# Patient Record
Sex: FEMALE | Race: WHITE | Employment: UNEMPLOYED | URBAN - METROPOLITAN AREA
[De-identification: names, ages, dates, MRNs, and addresses within clinical notes are randomized per-mention and may not be internally consistent; named-entity substitution may affect disease eponyms.]

---

## 2017-07-03 ENCOUNTER — HOSPITAL ENCOUNTER (OUTPATIENT)
Dept: LAB | Age: 32
Discharge: HOME | End: 2017-07-03
Payer: OTHER GOVERNMENT

## 2017-07-03 ENCOUNTER — OFFICE VISIT (OUTPATIENT)
Dept: FAMILY MEDICINE CLINIC | Age: 32
End: 2017-07-03

## 2017-07-03 ENCOUNTER — OFFICE VISIT (OUTPATIENT)
Dept: OBGYN CLINIC | Age: 32
End: 2017-07-03

## 2017-07-03 VITALS
HEART RATE: 78 BPM | DIASTOLIC BLOOD PRESSURE: 71 MMHG | RESPIRATION RATE: 16 BRPM | BODY MASS INDEX: 20.72 KG/M2 | HEIGHT: 62 IN | SYSTOLIC BLOOD PRESSURE: 102 MMHG | TEMPERATURE: 99.1 F | OXYGEN SATURATION: 98 % | WEIGHT: 112.6 LBS

## 2017-07-03 VITALS
DIASTOLIC BLOOD PRESSURE: 72 MMHG | WEIGHT: 113 LBS | HEART RATE: 84 BPM | SYSTOLIC BLOOD PRESSURE: 97 MMHG | BODY MASS INDEX: 20.8 KG/M2 | RESPIRATION RATE: 18 BRPM | HEIGHT: 62 IN

## 2017-07-03 DIAGNOSIS — J30.89 ENVIRONMENTAL AND SEASONAL ALLERGIES: ICD-10-CM

## 2017-07-03 DIAGNOSIS — Z76.89 ENCOUNTER TO ESTABLISH CARE: ICD-10-CM

## 2017-07-03 DIAGNOSIS — Z01.419 WELL WOMAN EXAM WITH ROUTINE GYNECOLOGICAL EXAM: Primary | ICD-10-CM

## 2017-07-03 DIAGNOSIS — J02.9 SORE THROAT: Primary | ICD-10-CM

## 2017-07-03 DIAGNOSIS — R05.9 COUGH: ICD-10-CM

## 2017-07-03 LAB
S PYO AG THROAT QL: NEGATIVE
VALID INTERNAL CONTROL?: YES

## 2017-07-03 PROCEDURE — 87625 HPV TYPES 16 & 18 ONLY: CPT | Performed by: OBSTETRICS & GYNECOLOGY

## 2017-07-03 PROCEDURE — 88175 CYTOPATH C/V AUTO FLUID REDO: CPT | Performed by: OBSTETRICS & GYNECOLOGY

## 2017-07-03 PROCEDURE — 87624 HPV HI-RISK TYP POOLED RSLT: CPT | Performed by: OBSTETRICS & GYNECOLOGY

## 2017-07-03 PROCEDURE — 87491 CHLMYD TRACH DNA AMP PROBE: CPT | Performed by: OBSTETRICS & GYNECOLOGY

## 2017-07-03 RX ORDER — MICONAZOLE NITRATE 2 %
1 CREAM WITH APPLICATOR VAGINAL
COMMUNITY
End: 2018-01-30

## 2017-07-03 RX ORDER — LORATADINE 10 MG/1
10 TABLET ORAL
Qty: 30 TAB | Refills: 0 | Status: SHIPPED | OUTPATIENT
Start: 2017-07-03

## 2017-07-03 RX ORDER — BENZONATATE 100 MG/1
100 CAPSULE ORAL
Qty: 21 CAP | Refills: 0 | Status: SHIPPED | OUTPATIENT
Start: 2017-07-03 | End: 2017-07-10

## 2017-07-03 NOTE — PROGRESS NOTES
Christiano Muro is a 29 y.o.  female and presents with    Chief Complaint   Patient presents with    Cough     Patient has had cough for about a week.  Establish Care       Subjective:  Ms. Nancy Narayanan presents today as a new patient with complaints of cough that has been present for about a week. Initially cough was a dry cough but now she states she is coughing up whitish/greenish mucous. She reports post nasal drip but denies runny nose. She denies fever or chills. She states her  and daughter have recently gotten over coughs. She does not have history of seasonal allergies. She states the cough is worse in the mornings. She has not tried any over the counter medications for her symptoms. Additional Concerns: Ms. Nancy Narayanan is here to establish care. There is no problem list on file for this patient. No Known Allergies  History reviewed. No pertinent past medical history. Past Surgical History:   Procedure Laterality Date    HX GYN      cryotherapy to cervix      Family History   Problem Relation Age of Onset    Cancer Mother      Breast Cancer/Cervical Cancer    COPD Mother     No Known Problems Father     Cancer Maternal Aunt      Metastatic Cancer     Social History   Substance Use Topics    Smoking status: Former Smoker    Smokeless tobacco: Never Used    Alcohol use Yes      Comment: ocassionally       ROS   History obtained from the patient  General ROS: negative for - chills or fever  Ophthalmic ROS: negative for - itchy eyes  ENT ROS: positive for - sore throat  Respiratory ROS: positive for - cough and shortness of breath  Cardiovascular ROS: no chest pain or dyspnea on exertion    All other systems reviewed and are negative.       Objective:  Vitals:    07/03/17 1257   BP: 102/71   Pulse: 78   Resp: 16   Temp: 99.1 °F (37.3 °C)   TempSrc: Oral   SpO2: 98%   Weight: 112 lb 9.6 oz (51.1 kg)   Height: 5' 2\" (1.575 m)   PainSc:   0 - No pain   LMP: 06/24/2017 PE  General appearance - alert, well appearing, and in no distress  Mental status - normal mood, behavior, speech, dress, motor activity, and thought processes  Ears - bilateral TM's and external ear canals normal  Mouth - mucous membranes moist, pharynx normal without lesions  Neck - supple, no significant adenopathy  Chest - clear to auscultation, no wheezes, rales or rhonchi, symmetric air entry  Heart - normal rate and regular rhythm    LABS   7/3/2017  1:21 PM - Evins Lights, LPN   Component Results   Component Value Flag Ref Range Units Status   VALID INTERNAL CONTROL POC Yes    Final   Group A Strep Ag Negative  Negative  Final       Assessment/Plan:    1. Sore Throat- rapid strep negative; symptomatic care    2. Cough- may be related to allergies; tessalon for symptoms    3. Environmental allergies- trial of Claritin for at least 2 weeks to see if symptoms subside; return to office if they persist    Lab review: no lab studies available for review at time of visit    Today's Visit: Tessalon, Claritin    Health Maintenance:     I have discussed the diagnosis with the patient and the intended plan as seen in the above orders. The patient has received an after-visit summary and questions were answered concerning future plans. I have discussed medication side effects and warnings with the patient as well. I have reviewed the plan of care with the patient, accepted their input and they are in agreement with the treatment goals. Follow-up Disposition:  Return if symptoms worsen or fail to improve. More than 1/2 of this 20 minute visit was spent in counseling and coordination of care, as described above.     Kim Mane, RAMIN

## 2017-07-03 NOTE — PROGRESS NOTES
1. Have you been to the ER, urgent care clinic since your last visit? Hospitalized since your last visit? No    2. Have you seen or consulted any other health care providers outside of the [address] [place] [address] since your last visit? Include any pap smears or colon screening.  No

## 2017-07-03 NOTE — PROGRESS NOTES
[facility]  [address] [facility], [address] [place], [place] [address] EXAM    Name: Shalom Gupta MRN: [id number] SSN: [id number]    YOB: 1985  Age: 29 y.o. Sex: female       Subjective:      Chief complaint:    Chief Complaint   Patient presents with    Well Woman       Anais Zamora is a 29 y.o. female presents today for well visit. No complaints. Taking monistat 7 for yeast infection. Review of Systems:   General ROS: negative for - fever, chills, malaise  Endocrine ROS: negative for -  breast tenderness/mass/nipple discharge/galactorrhea, hair pattern changes, hot flashes, skin changes or temperature intolerance. Respiratory ROS: no cough, shortness of breath, or wheezing  Cardiovascular ROS: no chest pain or dyspnea on exertion  Gastrointestinal ROS: no abdominal pain, change in bowel habits, or black or bloody stools, nausea, vomiting  Genito-Urinary ROS: no dysuria, trouble voiding, incontinence or hematuria. No pelvic pain, abnormal bleeding  Musculoskeletal ROS: negative  Neurological ROS: no TIA or stroke symptoms  Dermatological ROS: negative  Denies personal or FH of fractures. OB History      Para Term  AB Living    5 3 3  2 3    SAB TAB Ectopic Molar Multiple Live Births    2     3        Gynecology:  Regular menses since menarche. 28 day cycle. Duration 5-7 days. Moderate flow. mild cramps. no hx of STI. The current method of family planning is condoms most of the time. Health maintenance:  hx of abnormal pap smears, s/p cryo  Last pap:  .   Past Medical History:   Diagnosis Date    Abnormal Papanicolaou smear of cervix      Past Surgical History:   Procedure Laterality Date    HX GYN      cryotherapy to cervix      Family History   Problem Relation Age of Onset    Cancer Mother 52     Breast Cancer/Cervical Cancer    COPD Mother     No Known Problems Father     Cancer Maternal Aunt 44 Metastatic Cancer, unclear primary     Social History     Occupational History    Not on file. Social History Main Topics    Smoking status: Former Smoker    Smokeless tobacco: Never Used    Alcohol use Yes      Comment: ocassionally    Drug use: No    Sexual activity: Yes     Partners: Male     Birth control/ protection: Condom     Family History   Problem Relation Age of Onset    Cancer Mother 52     Breast Cancer/Cervical Cancer    COPD Mother     No Known Problems Father     Cancer Maternal Aunt 40     Metastatic Cancer, unclear primary     No Known Allergies  Prior to Admission medications    Medication Sig Start Date End Date Taking? Authorizing Provider   miconazole (MONISTAT 7) 2 % vaginal cream Insert 1 Applicator into vagina nightly. Yes Historical Provider   benzonatate (TESSALON) 100 mg capsule Take 1 Cap by mouth three (3) times daily as needed for Cough for up to 7 days. 7/3/17 7/10/17  Alberto Smith NP   loratadine (CLARITIN) 10 mg tablet Take 1 Tab by mouth daily as needed for Allergies. 7/3/17   Alberto Smith NP          Objective:     Vitals:    07/03/17 1524   BP: 97/72   Pulse: 84   Resp: 18   Weight: 113 lb (51.3 kg)   Height: 5' 2\" (1.575 m)     Body mass index is 20.67 kg/(m^2). Physical Exam:  General appearance - well appearing, and in no distress and well hydrated  Mental status - alert, oriented to person, place, and time, normal mood, behavior, speech, dress, motor activity, and thought processes  Neck:  No lymphadenopathy, no thyroid enlargement/tenderness  Respiratory:  Normal respiratory effort, no intercostal retractions or use of accessory muscles. Breasts - breasts appear normal, no suspicious masses, no skin or nipple changes or axillary nodes  Abdomen - soft, nontender, nondistended, no masses or organomegaly  Pelvic - No inguinal lymphadenopathy, normal urethral meatus and no bladder tenderness.  VULVA: normal appearing vulva with no masses, tenderness or lesions, VAGINA: normal appearing vagina with normal color and monistat wiped to visualize cervix, no lesions, PELVIC FLOOR EXAM: no cystocele, rectocele or prolapse noted,CERVIX: normal appearing cervix without discharge or lesions, UTERUS: uterus is normal size, shape, consistency and nontender, mobile, ADNEXA: normal adnexa in size, nontender and no masses  Extremities - No edema. Skin - normal coloration and turgor, no rashes, no suspicious skin lesions noted    Assessment/Plan:       ICD-10-CM ICD-9-CM    1. Well woman exam with routine gynecological exam Z01.419 V72.31 PAP IG, CT-NG-TV, APTIMA HPV AND [address] 25/74,42([id number])       Encouraged breast self-awareness. Start PNV  Current pap smear screening guidelines reviewed. Continue healthy lifestyle with staying nicotine-free, caloric restrictions and regular exercise. Follow up annually/prn. Plan of care discussed. Patient expressed understanding.       Signed By:  Delvis Harding DO     July 3, 2017

## 2017-07-03 NOTE — MR AVS SNAPSHOT
Visit Information Date & Time Provider Department Dept. Phone Encounter #  
 7/3/2017  1:00 PM Iraida Ferrara NP [address] [address] [phone] Follow-up Instructions Return if symptoms worsen or fail to improve. Your Appointments 7/3/2017  3:15 PM  
NEW PATIENT ANNUAL with Stellanoemy MacdonaldDO  
[address] [place] [address] ([facility]) Appt Note: NEW PATIENT, ANNUAL,  
 [address] [facility] [address] [phone]  
  
   
 [address] [facility] [phone] Upcoming Health Maintenance Date Due DTaP/Tdap/Td series (1 - Tdap) 2/12/2008 PAP AKA CERVICAL CYTOLOGY 2/12/2008 INFLUENZA AGE 9 TO ADULT 8/1/2017 Allergies as of 7/3/2017  Review Complete On: 7/3/2017 By: Iraida Ferrara NP No Known Allergies Current Immunizations  Never Reviewed No immunizations on file. Not reviewed this visit You Were Diagnosed With   
  
 Codes Comments Sore throat    -  Primary ICD-10-CM: J02.9 ICD-9-CM: 209 Cough     ICD-10-CM: R05 ICD-9-CM: 786.2 Environmental and seasonal allergies     ICD-10-CM: J30.89 ICD-9-CM: 477.8 Encounter to establish care     ICD-10-CM: Z76.89 
ICD-9-CM: V65.8 Vitals BP Pulse Temp Resp Height(growth percentile) Weight(growth percentile) 102/71 (BP 1 Location: Left arm, BP Patient Position: Sitting) 78 99.1 °F (37.3 °C) (Oral) 16 5' 2\" (1.575 m) 112 lb 9.6 oz (51.1 kg) LMP SpO2 BMI OB Status Smoking Status 06/24/2017 98% 20.59 kg/m2 Having regular periods Former Smoker BMI and BSA Data Body Mass Index Body Surface Area 20.59 kg/m 2 1.5 m 2 Preferred Pharmacy Pharmacy Name Phone [facility] [address] [facility] 11 [facility] [phone] Your Updated Medication List  
  
   
This list is accurate as of: 7/3/17  2:01 PM.  Always use your most recent med list.  
  
  
  
 benzonatate 100 mg capsule Commonly known as:  TESSALON Take 1 Cap by mouth three (3) times daily as needed for Cough for up to 7 days. loratadine 10 mg tablet Commonly known as:  Og Moulder Take 1 Tab by mouth daily as needed for Allergies. Prescriptions Sent to Pharmacy Refills  
 benzonatate (TESSALON) 100 mg capsule 0 Sig: Take 1 Cap by mouth three (3) times daily as needed for Cough for up to 7 days. Class: Normal  
 Pharmacy: [facility] [address] [place] [address] #: [phone] Route: Oral  
 loratadine (CLARITIN) 10 mg tablet 0 Sig: Take 1 Tab by mouth daily as needed for Allergies. Class: Normal  
 Pharmacy: [facility] [address] [place] [address] #: [phone] Route: Oral  
  
We Performed the Following AMB POC RAPID STREP A [20243 CPT(R)] Follow-up Instructions Return if symptoms worsen or fail to improve. Introducing [facility] & HEALTH SERVICES! [facility] introduces [employer] patient portal. Now you can access parts of your medical record, email your doctor's office, and request medication refills online. 1. In your internet browser, go to [email or web address] 2. Click on the First Time User? Click Here link in the Sign In box. You will see the New Member Sign Up page. 3. Enter your [employer] Access Code exactly as it appears below. You will not need to use this code after youve completed the sign-up process. If you do not sign up before the expiration date, you must request a new code. · [employer] Access Code: [email or web address] Expires: 10/1/2017  1:53 PM 
 
4. Enter the last four digits of your Social Security Number (xxxx) and Date of Birth (mm/dd/yyyy) as indicated and click Submit. You will be taken to the next sign-up page. 5. Create a [employer] ID. This will be your [employer] login ID and cannot be changed, so think of one that is secure and easy to remember. 6. Create a [employer] password. You can change your password at any time. 7. Enter your Password Reset Question and Answer. This can be used at a later time if you forget your password. 8. Enter your e-mail address. You will receive e-mail notification when new information is available in [address] 9. Click Sign Up. You can now view and download portions of your medical record. 10. Click the Download Summary menu link to download a portable copy of your medical information. If you have questions, please visit the Frequently Asked Questions section of the [employer] website. Remember, [employer] is NOT to be used for urgent needs. For medical emergencies, dial 911. Now available from your iPhone and Android! Please provide this summary of care documentation to your next provider. If you have any questions after today's visit, please call [phone].

## 2017-07-03 NOTE — PATIENT INSTRUCTIONS

## 2017-07-05 LAB
C TRACH RRNA SPEC QL NAA+PROBE: NEGATIVE
N GONORRHOEA RRNA SPEC QL NAA+PROBE: NEGATIVE
SPECIMEN SOURCE: NORMAL
T VAGINALIS RRNA SPEC QL NAA+PROBE: NEGATIVE

## 2017-07-17 NOTE — PROGRESS NOTES
Patient notified and voices understanding:  Abn pap smear, Dr. Domingo Tatum recommends colposcopy. patient agrees to call back for appt after discussing this with spouse dur to transportation issue.

## 2017-07-26 ENCOUNTER — HOSPITAL ENCOUNTER (OUTPATIENT)
Dept: LAB | Age: 32
Discharge: HOME | End: 2017-07-26
Payer: OTHER GOVERNMENT

## 2017-07-26 ENCOUNTER — OFFICE VISIT (OUTPATIENT)
Dept: OBGYN CLINIC | Age: 32
End: 2017-07-26

## 2017-07-26 VITALS
SYSTOLIC BLOOD PRESSURE: 110 MMHG | DIASTOLIC BLOOD PRESSURE: 82 MMHG | HEART RATE: 130 BPM | BODY MASS INDEX: 20.24 KG/M2 | WEIGHT: 110 LBS | HEIGHT: 62 IN

## 2017-07-26 DIAGNOSIS — Z32.02 URINE PREGNANCY TEST NEGATIVE: ICD-10-CM

## 2017-07-26 DIAGNOSIS — R87.810 ASCUS WITH POSITIVE HIGH RISK HPV CERVICAL: Primary | ICD-10-CM

## 2017-07-26 DIAGNOSIS — R30.9 PAINFUL URINATION: ICD-10-CM

## 2017-07-26 DIAGNOSIS — R87.610 ASCUS WITH POSITIVE HIGH RISK HPV CERVICAL: Primary | ICD-10-CM

## 2017-07-26 LAB
BILIRUB UR QL STRIP: NEGATIVE
GLUCOSE UR-MCNC: NEGATIVE MG/DL
HCG URINE, QL. (POC): NEGATIVE
KETONES P FAST UR STRIP-MCNC: NEGATIVE MG/DL
PH UR STRIP: 8 [PH] (ref 4.6–8)
PROT UR QL STRIP: NEGATIVE MG/DL
SP GR UR STRIP: 1.01 (ref 1–1.03)
UA UROBILINOGEN AMB POC: NORMAL (ref 0.2–1)
URINALYSIS CLARITY POC: CLEAR
URINALYSIS COLOR POC: YELLOW
URINE BLOOD POC: NEGATIVE
URINE LEUKOCYTES POC: NEGATIVE
URINE NITRITES POC: NEGATIVE
VALID INTERNAL CONTROL?: YES

## 2017-07-26 PROCEDURE — 88305 TISSUE EXAM BY PATHOLOGIST: CPT | Performed by: OBSTETRICS & GYNECOLOGY

## 2017-07-26 NOTE — MR AVS SNAPSHOT
Visit Information Date & Time Provider Department Dept. Phone Encounter #  
 7/26/2017  9:00 AM DO Kenrick Felder [phone] [id number] Upcoming Health Maintenance Date Due INFLUENZA AGE 9 TO ADULT 8/1/2017 PAP AKA CERVICAL CYTOLOGY 7/3/2020 Allergies as of 7/26/2017  Review Complete On: 7/26/2017 By: Melania Baptiste LPN No Known Allergies Current Immunizations  Never Reviewed No immunizations on file. Not reviewed this visit You Were Diagnosed With   
  
 Codes Comments ASCUS with positive high risk HPV cervical    -  Primary ICD-10-CM: R87.610, R87.810 ICD-9-CM: 795.01, 795.05 Vitals BP Pulse Height(growth percentile) Weight(growth percentile) LMP BMI  
 110/82 (!) 130 5' 2\" (1.575 m) 110 lb (49.9 kg) 07/17/2017 (Approximate) 20.12 kg/m2 OB Status Smoking Status Having regular periods Former Smoker BMI and BSA Data Body Mass Index Body Surface Area  
 20.12 kg/m 2 1.48 m 2 Preferred Pharmacy Pharmacy Name Phone [facility], [address] [phone] Your Updated Medication List  
  
   
This list is accurate as of: 7/26/17 10:12 AM.  Always use your most recent med list.  
  
  
  
  
 loratadine 10 mg tablet Commonly known as:  Theone Handing Take 1 Tab by mouth daily as needed for Allergies. MONISTAT 7 2 % vaginal cream  
Generic drug:  miconazole Insert 1 Applicator into vagina nightly. We Performed the Following CT COLPOSC,CERVIX W/ADJ VAG,W/BX & CURRETAG U4700549 CPT(R)] Patient Instructions Colposcopy: What to Expect at [facility] Your Recovery You may feel some soreness in your vagina for a day or two if you had a biopsy. Some vaginal bleeding or discharge is normal for up to a week after a biopsy.  The discharge may be dark-colored if a solution was put on your cervix. You can use a sanitary pad for the bleeding. It may take a week or two for you to get the test results. This care sheet gives you a general idea about how long it will take for you to recover. But each person recovers at a different pace. Follow the steps below to feel better as quickly as possible. How can you care for yourself at home? Activity · You can return to work and most daily activities right after the test. 
Exercise · Do not exercise for 1 day after the test. 
Medicines · Your doctor will tell you if and when you can restart your medicines. He or she will also give you instructions about taking any new medicines. · If you take blood thinners, such as warfarin (Coumadin), clopidogrel (Plavix), or aspirin, be sure to talk to your doctor. He or she will tell you if and when to start taking those medicines again. Make sure that you understand exactly what your doctor wants you to do. · Take an over-the-counter pain medicine, such as acetaminophen (Tylenol), ibuprofen (Advil, Motrin), or naproxen (Aleve). Be safe with medicines. Read and follow all instructions on the label. Do not take two or more pain medicines at the same time unless the doctor told you to. Many pain medicines have acetaminophen, which is Tylenol. Too much acetaminophen (Tylenol) can be harmful. Other instructions · Use a pad if you have some bleeding. · Do not douche, have sexual intercourse, or use tampons for 1 week if you had a biopsy. This will allow time for your cervix to heal. 
· You can take a bath or shower anytime after the test. 
Follow-up care is a key part of your treatment and safety. Be sure to make and go to all appointments, and call your doctor if you are having problems. It's also a good idea to know your test results and keep a list of the medicines you take. When should you call for help? Call your doctor now or seek immediate medical care if: · You have severe vaginal bleeding. This means that you are soaking through your usual pads or tampons each hour for 2 or more hours. · You have pain that does not get better after you take pain medicine. · You have signs of infection, such as: 
¨ Increased pain. ¨ Bad-smelling vaginal discharge. ¨ A fever. Watch closely for any changes in your health, and be sure to contact your doctor if: 
· You have questions or concerns. Where can you learn more? Go to http://danial-patricia.info/. Enter M523 in the search box to learn more about \"Colposcopy: What to Expect at Home. \" Current as of: May 3, 2017 Content Version: 11.3 © 2931-3194 [employer]. Care instructions adapted under license by [employer] (which disclaims liability or warranty for this information). If you have questions about a medical condition or this instruction, always ask your healthcare professional. [place] [address] any warranty or liability for your use of this information. Introducing [facility] & HEALTH SERVICES! Héctor Harper introduces [employer] patient portal. Now you can access parts of your medical record, email your doctor's office, and request medication refills online. 1. In your internet browser, go to [email or web address] 2. Click on the First Time User? Click Here link in the Sign In box. You will see the New Member Sign Up page. 3. Enter your [employer] Access Code exactly as it appears below. You will not need to use this code after youve completed the sign-up process. If you do not sign up before the expiration date, you must request a new code. · [employer] Access Code: [email or web address] Expires: 10/1/2017  1:53 PM 
 
4. Enter the last four digits of your Social Security Number (xxxx) and Date of Birth (mm/dd/yyyy) as indicated and click Submit. You will be taken to the next sign-up page. 5. Create a [employer] ID. This will be your [employer] login ID and cannot be changed, so think of one that is secure and easy to remember. 6. Create a [employer] password. You can change your password at any time. 7. Enter your Password Reset Question and Answer. This can be used at a later time if you forget your password. 8. Enter your e-mail address. You will receive e-mail notification when new information is available in [address] 9. Click Sign Up. You can now view and download portions of your medical record. 10. Click the Download Summary menu link to download a portable copy of your medical information. If you have questions, please visit the Frequently Asked Questions section of the [employer] website. Remember, [employer] is NOT to be used for urgent needs. For medical emergencies, dial 911. Now available from your iPhone and Android! Please provide this summary of care documentation to your next provider. If you have any questions after today's visit, please call [phone].

## 2017-07-26 NOTE — PROGRESS NOTES
Patient c/o painful urination without frequency/urgency. Here for scheduled colposcopy. See procedure note. A/P    ICD-10-CM ICD-9-CM    1. ASCUS with positive high risk HPV cervical R87.610 795.01 [place] COLPOSC,CERVIX W/ADJ VAG,W/BX & CURRETAG    R87.810 795.05 SURGICAL PATHOLOGY      AMB POC URINE PREGNANCY TEST, VISUAL COLOR COMPARISON   2. Painful urination R30.9 788.1 CULTURE, URINE      AMB POC URINALYSIS DIP STICK MANUAL W/O MICRO   UA unremarkable. Increase PO hydration. Plan for repeat pap in 12 mos. Early follow up depending on pathology report. Plan of care discussed. Patient expressed understanding and agreement.

## 2017-07-26 NOTE — PROCEDURES
[facility]  [address] [facility] [place], [place]  [phone]                                      COLPOSCOPY    Chart reviewed for the following:   Nimisha SALDANA DO, have reviewed the History, Physical and updated the Allergic reactions for Edna Magana performed immediately prior to start of procedure:   Nimisha SALDANA DO, have performed the following reviews on Colon [place] prior to the start of the procedure:            * Patient was identified by name and date of birth   * Agreement on procedure being performed was verified  * Risks and Benefits explained to the patient  * Procedure site verified and marked as necessary  * Patient was positioned for comfort  * Consent was signed and verified     Time: 0950    Date of procedure: 7/26/2017    Procedure performed by:  Nimisha Gonzales DO    Provider assisted by: Doris Yadav LPN    Patient assisted by: self    How tolerated by patient: tolerated the procedure well with no complications    Post Procedural Pain Scale: 2 - Hurts Little Bit        PRE-OP:   1. ASCUS with positive high risk HPV cervical        POST-OP:   1. ASCUS with positive high risk HPV cervical        PROCEDURE:  Colposcopy with biopsy and Endocervical Curettage (55222)    SURGEON:  Nimisha Gonzales DO    EBL: Scant. SPECIMEN: endocervical curettings and cervical biopsies at 1:00 and 8:28.    COMPLICATIONS: none      PROCEDURE:    After informed consent obtained, the patient was placed in dorsal lithotomy position. Speculum was inserted and the cervical mucus wiped with dry scopette. 5% acetic acid spray used to wet the cervix. Colposcopy was performed and the entire transformation zone was not visualized. Lugols solution applied. Scant acetowhite epithelium with mosaism appearance noted at 1:00 and 4:00. No atypical vessels seen. ECC performed. 1 and 4:00 area biopsied once. Monsels solution applied over the area. Speculum removed. The patient tolerated the procedure well. All questions answered. Disposition:  Pathology report to be discussed in 2 weeks. Surveillance pap smear in 12 months. Plan of care discussed. Patient expressed understanding and agreement.       Isamar Lundy DO  07/26/17

## 2017-07-26 NOTE — PATIENT INSTRUCTIONS
Colposcopy: What to Expect at [address] may feel some soreness in your vagina for a day or two if you had a biopsy. Some vaginal bleeding or discharge is normal for up to a week after a biopsy. The discharge may be dark-colored if a solution was put on your cervix. You can use a sanitary pad for the bleeding. It may take a week or two for you to get the test results. This care sheet gives you a general idea about how long it will take for you to recover. But each person recovers at a different pace. Follow the steps below to feel better as quickly as possible. How can you care for yourself at home? Activity  · You can return to work and most daily activities right after the test.  Exercise  · Do not exercise for 1 day after the test.  Medicines  · Your doctor will tell you if and when you can restart your medicines. He or she will also give you instructions about taking any new medicines. · If you take blood thinners, such as warfarin (Coumadin), clopidogrel (Plavix), or aspirin, be sure to talk to your doctor. He or she will tell you if and when to start taking those medicines again. Make sure that you understand exactly what your doctor wants you to do. · Take an over-the-counter pain medicine, such as acetaminophen (Tylenol), ibuprofen (Advil, Motrin), or naproxen (Aleve). Be safe with medicines. Read and follow all instructions on the label. Do not take two or more pain medicines at the same time unless the doctor told you to. Many pain medicines have acetaminophen, which is Tylenol. Too much acetaminophen (Tylenol) can be harmful. Other instructions  · Use a pad if you have some bleeding. · Do not douche, have sexual intercourse, or use tampons for 1 week if you had a biopsy. This will allow time for your cervix to heal.  · You can take a bath or shower anytime after the test.  Follow-up care is a key part of your treatment and safety.  Be sure to make and go to all appointments, and call your doctor if you are having problems. It's also a good idea to know your test results and keep a list of the medicines you take. When should you call for help? Call your doctor now or seek immediate medical care if:  · You have severe vaginal bleeding. This means that you are soaking through your usual pads or tampons each hour for 2 or more hours. · You have pain that does not get better after you take pain medicine. · You have signs of infection, such as:  ¨ Increased pain. ¨ Bad-smelling vaginal discharge. ¨ A fever. Watch closely for any changes in your health, and be sure to contact your doctor if:  · You have questions or concerns. Where can you learn more? Go to http://danial-patricia.info/. Enter M523 in the search box to learn more about \"Colposcopy: What to Expect at Home. \"  Current as of: May 3, 2017  Content Version: 11.3  © 5395-0874 [employer]. Care instructions adapted under license by [employer] (which disclaims liability or warranty for this information). If you have questions about a medical condition or this instruction, always ask your healthcare professional. [place] [address] any warranty or liability for your use of this information.

## 2017-07-27 LAB
BACTERIA SPEC CULT: NORMAL
SERVICE CMNT-IMP: NORMAL

## 2017-08-01 NOTE — PROGRESS NOTES
A:  ENDOCERVICAL CURETTINGS:  NEGATIVE FOR DYSPLASIA OR MALIGNANCY. B, C:  CERVICAL BIOPSIES AT ONE O'CLOCK AND FOUR O'CLOCK:  SEVERE ACUTE AND CHRONIC CERVICITIS. NEGATIVE FOR DYSPLASIA OR MALIGNANCY. Follow up pap smear as planned.

## 2017-10-03 ENCOUNTER — TELEPHONE (OUTPATIENT)
Dept: FAMILY MEDICINE CLINIC | Age: 32
End: 2017-10-03

## 2017-10-03 ENCOUNTER — OFFICE VISIT (OUTPATIENT)
Dept: FAMILY MEDICINE CLINIC | Age: 32
End: 2017-10-03

## 2017-10-03 VITALS
BODY MASS INDEX: 20.8 KG/M2 | HEIGHT: 62 IN | RESPIRATION RATE: 19 BRPM | WEIGHT: 113 LBS | SYSTOLIC BLOOD PRESSURE: 107 MMHG | OXYGEN SATURATION: 97 % | TEMPERATURE: 96.5 F | DIASTOLIC BLOOD PRESSURE: 69 MMHG | HEART RATE: 94 BPM

## 2017-10-03 DIAGNOSIS — Z01.84 IMMUNITY STATUS TESTING: ICD-10-CM

## 2017-10-03 DIAGNOSIS — Z23 ENCOUNTER FOR IMMUNIZATION: ICD-10-CM

## 2017-10-03 DIAGNOSIS — Z00.00 ANNUAL PHYSICAL EXAM: Primary | ICD-10-CM

## 2017-10-03 DIAGNOSIS — Z00.00 ANNUAL PHYSICAL EXAM: ICD-10-CM

## 2017-10-03 NOTE — PROGRESS NOTES
SUBJECTIVE:  Suly Thornton is a 29 y.o. female who presents today for complete physical    1. Have you been to the ER, urgent care clinic since your last visit? Hospitalized since your last visit? NO    2. Have you seen or consulted any other health care providers outside of the [address] [place] [address] since your last visit? Include any pap smears or colon screening. NO    Health Maintenance reviewed  Yes Patient refused influenza vaccine    Health Maintenance Due   Topic Date Due    DTaP/Tdap/Td series (1 - Tdap) 02/12/2008    INFLUENZA AGE 9 TO ADULT  08/01/2017

## 2017-10-03 NOTE — PROGRESS NOTES
Patient brought in vaccination records. Missing vaccines noted. Per form for [facility] worker will need MMR titer, Quiana titer, Hep B titer and Hep A titer. Will needs Tdap or Td to be administered if proof of vaccination cannot be obtained.

## 2017-10-03 NOTE — PROGRESS NOTES
Suly hTornton is a 29 y.o.  female and presents with    Chief Complaint   Patient presents with    Complete Physical       Subjective: Annual Physical  Ms. Augustus Nunn presents today for a complete physical. 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 She has no acute concerns today. Additional Concerns: No         There is no problem list on file for this patient. Current Outpatient Prescriptions   Medication Sig Dispense Refill    loratadine (CLARITIN) 10 mg tablet Take 1 Tab by mouth daily as needed for Allergies. 30 Tab 0    miconazole (MONISTAT 7) 2 % vaginal cream Insert 1 Applicator into vagina nightly.        No Known Allergies  Past Medical History:   Diagnosis Date    Abnormal Papanicolaou smear of cervix 2007     Past Surgical History:   Procedure Laterality Date    COLPOSC,CERVIX W/ADJ VAG,W/BX & CURRETAG  7/26/2017    HX GYN      cryotherapy to cervix      Family History   Problem Relation Age of Onset    Cancer Mother 52     Breast Cancer/Cervical Cancer    COPD Mother     No Known Problems Father     Cancer Maternal Aunt 40     Metastatic Cancer, unclear primary     Social History   Substance Use Topics    Smoking status: Former Smoker    Smokeless tobacco: Never Used    Alcohol use Yes      Comment: ocassionally       ROS   History obtained from the patient  General ROS: negative for - chills or fever  Psychological ROS: negative for - anxiety or depression  Ophthalmic ROS: negative for - blurry vision or double vision  ENT ROS: negative for - hearing change or sore throat  Respiratory ROS: no cough, shortness of breath, or wheezing  Cardiovascular ROS: no chest pain or dyspnea on exertion  Gastrointestinal ROS: no abdominal pain, change in bowel habits, or black or bloody stools  Genito-Urinary ROS: no dysuria, trouble voiding, or hematuria  Musculoskeletal ROS: negative for - joint pain, joint stiffness or joint swelling  Neurological ROS: negative for - numbness/tingling  Dermatological ROS: negative for - rash    All other systems reviewed and are negative. Objective:  Vitals:    10/03/17 0900   BP: 107/69   Pulse: 94   Resp: 19   Temp: 96.5 °F (35.8 °C)   TempSrc: Oral   SpO2: 97%   Weight: 113 lb (51.3 kg)   Height: 5' 2\" (1.575 m)   PainSc:   0 - No pain   LMP: 09/04/2017     Hearing Screening (10/3/2017)  Edited by: Galo Alexandra LPN        [id number] 500hz 1000hz 2000hz 3000hz 4000hz 6000hz 8000hz     Right ear 40 40 40 40 40  40       Left ear 40 40 40 40 40  40       Method: Audiometry         Vision Screening (10/3/2017)  Edited by: Galo Alexandra LPN        Right eye Left eye Both eyes     Without correction 20/20 20/20 20/20         PE  General appearance - alert, well appearing, and in no distress  Mental status - normal mood, behavior, speech, dress, motor activity, and thought processes  Eyes - pupils equal and reactive, extraocular eye movements intact  Ears - bilateral TM's and external ear canals normal  Mouth - mucous membranes moist, pharynx normal without lesions  Neck - supple, no significant adenopathy  Chest - clear to auscultation, no wheezes, rales or rhonchi, symmetric air entry  Heart - normal rate and regular rhythm  Abdomen - soft, nontender, nondistended, no masses or organomegaly  Neurological - alert, oriented, normal speech, no focal findings or movement disorder noted, normal muscle tone, no tremors, strength 5/5  Musculoskeletal - no joint tenderness, deformity or swelling  Extremities - peripheral pulses normal, no pedal edema, no clubbing or cyanosis  Skin - normal coloration and turgor, no rashes, no suspicious skin lesions noted      Assessment/Plan:    1.  Annual Physical- completed; paperwork for [facility] worker pending immunization records (patient to bring in)    Lab review: no lab studies available for review at time of visit    Today's Visit: Annual Physical    Health Maintenance:   Influenza Vaccine- given today     I have discussed the diagnosis with the patient and the intended plan as seen in the above orders. The patient has received an after-visit summary and questions were answered concerning future plans. I have discussed medication side effects and warnings with the patient as well. I have reviewed the plan of care with the patient, accepted their input and they are in agreement with the treatment goals. Follow-up Disposition:  Return in about 1 year (around 10/3/2018) for annual physical.  More than 1/2 of this 25 minute visit was spent in counseling and coordination of care, as described above.     RAMIN Grier

## 2017-10-03 NOTE — TELEPHONE ENCOUNTER
Called patient to let her know that she needs to have titers completed because of missing vaccinations. Orders placed by MARICARMEN Le for this to be done by patient. Labs need to be drawn. Left a message for patient to contact office at her earliest convenience.

## 2017-10-03 NOTE — Clinical Note
Patient brought in vaccination records. Missing vaccines noted. 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 Will need Tdap or Td to be administered if proof of vaccination cannot be obtained.

## 2017-10-03 NOTE — MR AVS SNAPSHOT
Visit Information Date & Time Provider Department Dept. Phone Encounter #  
 10/3/2017  9:00 AM Tc Zhang, [address] [phone] Follow-up Instructions Return in about 1 year (around 10/3/2018) for annual physical.  
  
Upcoming Health Maintenance Date Due DTaP/Tdap/Td series (1 - Tdap) 2/12/2008 INFLUENZA AGE 9 TO ADULT 8/1/2017 PAP AKA CERVICAL CYTOLOGY 7/3/2020 Allergies as of 10/3/2017  Review Complete On: 10/3/2017 By: Ana Luisa Handy LPN No Known Allergies Current Immunizations  Reviewed on 10/3/2017 Name Date Influenza Vaccine (Quad) PF 10/3/2017 Reviewed by Ana Luisa Handy LPN on 92/9/0554 at  9:40 AM  
You Were Diagnosed With   
  
 Codes Comments Annual physical exam    -  Primary ICD-10-CM: Z00.00 ICD-9-CM: V70.0 Encounter for immunization     ICD-10-CM: R52 ICD-9-CM: V03.89 Vitals BP Pulse Temp Resp Height(growth percentile) Weight(growth percentile) 107/69 (BP 1 Location: Left arm, BP Patient Position: Sitting) 94 96.5 °F (35.8 °C) (Oral) 19 5' 2\" (1.575 m) 113 lb (51.3 kg) LMP SpO2 Breastfeeding? BMI OB Status Smoking Status 09/04/2017 (Exact Date) 97% No 20.67 kg/m2 Having regular periods Former Smoker Vitals History BMI and BSA Data Body Mass Index Body Surface Area  
 20.67 kg/m 2 1.5 m 2 Preferred Pharmacy Pharmacy Name Phone [facility], [address] [phone] Your Updated Medication List  
  
   
This list is accurate as of: 10/3/17  9:40 AM.  Always use your most recent med list.  
  
  
  
  
 loratadine 10 mg tablet Commonly known as:  Bobby Hippo Take 1 Tab by mouth daily as needed for Allergies. MONISTAT 7 2 % vaginal cream  
Generic drug:  miconazole Insert 1 Applicator into vagina nightly. We Performed the Following INFLUENZA VIRUS VAC QUAD,SPLIT,PRESV FREE SYRINGE IM [id number] CPT(R)] Follow-up Instructions Return in about 1 year (around 10/3/2018) for annual physical.  
  
  
Patient Instructions Well Visit, Ages 25 to 48: Care Instructions Your Care Instructions Physical exams can help you stay healthy. Your doctor has checked your overall health and may have suggested ways to take good care of yourself. He or she also may have recommended tests. At home, you can help prevent illness with healthy eating, regular exercise, and other steps. Follow-up care is a key part of your treatment and safety. Be sure to make and go to all appointments, and call your doctor if you are having problems. It's also a good idea to know your test results and keep a list of the medicines you take. How can you care for yourself at home? · Reach and stay at a healthy weight. This will lower your risk for many problems, such as obesity, diabetes, heart disease, and high blood pressure. · Get at least 30 minutes of physical activity on most days of the week. Walking is a good choice. You also may want to do other activities, such as running, swimming, cycling, or playing tennis or team sports. Discuss any changes in your exercise program with your doctor. · Do not smoke or allow others to smoke around you. If you need help quitting, talk to your doctor about stop-smoking programs and medicines. These can increase your chances of quitting for good. · Talk to your doctor about whether you have any risk factors for sexually transmitted infections (STIs). Having one sex partner (who does not have STIs and does not have sex with anyone else) is a good way to avoid these infections. · Use birth control if you do not want to have children at this time. Talk with your doctor about the choices available and what might be best for you. · Protect your skin from too much sun. When you're outdoors from 10 a.m. to 4 p.m., stay in the shade or cover up with clothing and a hat with a wide brim. Wear sunglasses that block UV rays. Even when it's cloudy, put broad-spectrum sunscreen (SPF 30 or higher) on any exposed skin. · See a dentist one or two times a year for checkups and to have your teeth cleaned. · Wear a seat belt in the car. · Drink alcohol in moderation, if at all. That means no more than 2 drinks a day for men and 1 drink a day for women. Follow your doctor's advice about when to have certain tests. These tests can spot problems early. For everyone · Cholesterol. Have the fat (cholesterol) in your blood tested after age 21. Your doctor will tell you how often to have this done based on your age, family history, or other things that can increase your risk for heart disease. · Blood pressure. Have your blood pressure checked during a routine doctor visit. Your doctor will tell you how often to check your blood pressure based on your age, your blood pressure results, and other factors. · Vision. Talk with your doctor about how often to have a glaucoma test. 
· Diabetes. Ask your doctor whether you should have tests for diabetes. · Colon cancer. Have a test for colon cancer at age 48. You may have one of several tests. If you are younger than 48, you may need a test earlier if you have any risk factors. Risk factors include whether you already had a precancerous polyp removed from your colon or whether your parent, brother, sister, or child has had colon cancer. For women · Breast exam and mammogram. Talk to your doctor about when you should have a clinical breast exam and a mammogram. Medical experts differ on whether and how often women under 50 should have these tests. Your doctor can help you decide what is right for you. · Pap test and pelvic exam. Begin Pap tests at age 24. A Pap test is the best way to find cervical cancer.  The test often is part of a pelvic exam. Ask how often to have this test. 
 · Tests for sexually transmitted infections (STIs). Ask whether you should have tests for STIs. You may be at risk if you have sex with more than one person, especially if your partners do not wear condoms. For men · Tests for sexually transmitted infections (STIs). Ask whether you should have tests for STIs. You may be at risk if you have sex with more than one person, especially if you do not wear a condom. · Testicular cancer exam. Ask your doctor whether you should check your testicles regularly. · Prostate exam. Talk to your doctor about whether you should have a blood test (called a PSA test) for prostate cancer. Experts differ on whether and when men should have this test. Some experts suggest it if you are older than 39 and are -American or have a father or brother who got prostate cancer when he was younger than 72. When should you call for help? Watch closely for changes in your health, and be sure to contact your doctor if you have any problems or symptoms that concern you. Where can you learn more? Go to http://danial-patricia.info/. Enter P072 in the search box to learn more about \"Well Visit, Ages 25 to 48: Care Instructions. \" Current as of: July 19, 2016 Content Version: 11.3 © 6489-3377 [employer] Exp[employer]. Care instructions adapted under license by Bilna (which disclaims liability or warranty for this information). If you have questions about a medical condition or this instruction, always ask your healthcare professional. [place] [address] any warranty or liability for your use of this information. Introducing [facility]! Dear Radha Orozco: Thank you for requesting a SteelHouse account. Our records indicate that you already have an active SteelHouse account. You can access your account anytime at [email or web address]://[employer]. "[employer] Did you know that you can access your hospital and ER discharge instructions at any time in Gr8erMinds? You can also review all of your test results from your hospital stay or ER visit. Additional Information If you have questions, please visit the Frequently Asked Questions section of the Gr8erMinds website at [email or web address]://[email or web address] [email or web address] Remember, Gr8erMinds is NOT to be used for urgent needs. For medical emergencies, dial 911. Now available from your iPhone and Android! Please provide this summary of care documentation to your next provider. Your primary care clinician is listed as Rivera Renteria. If you have any questions after today's visit, please call [phone].

## 2017-10-03 NOTE — Clinical Note
Patient brought in vaccination records. Missing vaccines noted. Per form for [facility] worker will need MMR titer, Quiana titer, Hep B titer and Hep A titer. Will needs Tdap or Td to be administered if proof of vaccination cannot be obtained. Please call patient to make her aware.

## 2017-10-03 NOTE — PROGRESS NOTES
Lexus Russell is a 29 y.o. female who presents for routine immunizations. She denies any symptoms , reactions or allergies that would exclude them from being immunized today. Risks and adverse reactions were discussed and the VIS was given to them. All questions were addressed. She was observed for 15 min post injection. There were no reactions observed.     Verbal order with read back per MARICARMEN Lucas request      Claudio Hay LPN

## 2017-10-04 ENCOUNTER — HOSPITAL ENCOUNTER (OUTPATIENT)
Dept: LAB | Age: 32
Discharge: HOME | End: 2017-10-04
Payer: OTHER GOVERNMENT

## 2017-10-04 LAB
ANION GAP SERPL CALC-SCNC: 4 MMOL/L (ref 3–18)
BUN SERPL-MCNC: 9 MG/DL (ref 7–18)
BUN/CREAT SERPL: 14 (ref 12–20)
CALCIUM SERPL-MCNC: 9.6 MG/DL (ref 8.5–10.1)
CHLORIDE SERPL-SCNC: 105 MMOL/L (ref 100–108)
CO2 SERPL-SCNC: 28 MMOL/L (ref 21–32)
CREAT SERPL-MCNC: 0.64 MG/DL (ref 0.6–1.3)
ERYTHROCYTE [DISTWIDTH] IN BLOOD BY AUTOMATED COUNT: 12.7 % (ref 11.6–14.5)
GLUCOSE SERPL-MCNC: 80 MG/DL (ref 74–99)
HCT VFR BLD AUTO: 41.7 % (ref 35–45)
HGB BLD-MCNC: 13.6 G/DL (ref 12–16)
MCH RBC QN AUTO: 30 PG (ref 24–34)
MCHC RBC AUTO-ENTMCNC: 32.6 G/DL (ref 31–37)
MCV RBC AUTO: 92.1 FL (ref 74–97)
PLATELET # BLD AUTO: 284 K/UL (ref 135–420)
PMV BLD AUTO: 9.2 FL (ref 9.2–11.8)
POTASSIUM SERPL-SCNC: 4.4 MMOL/L (ref 3.5–5.5)
RBC # BLD AUTO: 4.53 M/UL (ref 4.2–5.3)
SODIUM SERPL-SCNC: 137 MMOL/L (ref 136–145)
T4 FREE SERPL-MCNC: 1.1 NG/DL (ref 0.7–1.5)
TSH SERPL DL<=0.05 MIU/L-ACNC: 1.61 UIU/ML (ref 0.36–3.74)
WBC # BLD AUTO: 4.7 K/UL (ref 4.6–13.2)

## 2017-10-04 PROCEDURE — 80048 BASIC METABOLIC PNL TOTAL CA: CPT | Performed by: NURSE PRACTITIONER

## 2017-10-04 PROCEDURE — 84439 ASSAY OF FREE THYROXINE: CPT | Performed by: NURSE PRACTITIONER

## 2017-10-04 PROCEDURE — 85027 COMPLETE CBC AUTOMATED: CPT | Performed by: NURSE PRACTITIONER

## 2017-10-04 PROCEDURE — 86709 HEPATITIS A IGM ANTIBODY: CPT | Performed by: NURSE PRACTITIONER

## 2017-10-04 PROCEDURE — 86787 VARICELLA-ZOSTER ANTIBODY: CPT | Performed by: NURSE PRACTITIONER

## 2017-10-04 PROCEDURE — 86706 HEP B SURFACE ANTIBODY: CPT | Performed by: NURSE PRACTITIONER

## 2017-10-04 PROCEDURE — 86735 MUMPS ANTIBODY: CPT | Performed by: NURSE PRACTITIONER

## 2017-10-04 PROCEDURE — 36415 COLL VENOUS BLD VENIPUNCTURE: CPT | Performed by: NURSE PRACTITIONER

## 2017-10-05 LAB
HAV AB SER QL IA: NEGATIVE
HAV IGM SERPL QL IA: NEGATIVE
HBV SURFACE AB SER QL IA: POSITIVE
HBV SURFACE AB SERPL IA-ACNC: 176.58 MIU/ML
HEP BS AB COMMENT,HBSAC: NORMAL
MEV IGG SER IA-ACNC: 288 AU/ML
MUV IGG SER IA-ACNC: 54.2 AU/ML
RUBV IGG SERPL IA-ACNC: 5.75 INDEX
VZV IGG SER IA-ACNC: >4000 INDEX

## 2017-10-06 NOTE — TELEPHONE ENCOUNTER
Called Ms Ortega Leathachris to let her know that she will need to complete her Hep A and Td or TDAP vaccinations completed left a verbal message on an identifiable voice mail for her to return call at her earliest convenience.

## 2017-10-10 ENCOUNTER — OFFICE VISIT (OUTPATIENT)
Dept: FAMILY MEDICINE CLINIC | Age: 32
End: 2017-10-10

## 2017-10-10 VITALS
WEIGHT: 110.6 LBS | BODY MASS INDEX: 20.35 KG/M2 | HEART RATE: 96 BPM | HEIGHT: 62 IN | DIASTOLIC BLOOD PRESSURE: 74 MMHG | TEMPERATURE: 97.2 F | SYSTOLIC BLOOD PRESSURE: 110 MMHG | RESPIRATION RATE: 17 BRPM | OXYGEN SATURATION: 100 %

## 2017-10-10 DIAGNOSIS — Z23 ENCOUNTER FOR IMMUNIZATION: ICD-10-CM

## 2017-10-10 DIAGNOSIS — Z30.011 ENCOUNTER FOR INITIAL PRESCRIPTION OF CONTRACEPTIVE PILLS: Primary | ICD-10-CM

## 2017-10-10 RX ORDER — NORETHINDRONE ACETATE AND ETHINYL ESTRADIOL 1MG-20(21)
1 KIT ORAL DAILY
Qty: 3 PACKAGE | Refills: 4 | Status: SHIPPED | OUTPATIENT
Start: 2017-10-10 | End: 2018-11-01 | Stop reason: SDUPTHER

## 2017-10-10 NOTE — MR AVS SNAPSHOT
Visit Information Date & Time Provider Department Dept. Phone Encounter #  
 10/10/2017 10:30 AM Kar Garrison, [address] [facility] [phone] Follow-up Instructions Return in about 6 months (around 4/10/2018), or if symptoms worsen or fail to improve, for Hep A #2. Your Appointments 10/3/2018  9:00 AM  
COMPLETE PHYSICAL with Kar Garrison [place] [address] [facility] Appt Note: Return in about 1 year (around 10/3/2018) for annual physical.  
 [address] [facility] [address] [place]  
  
   
 [address] [place] [address] [phone] Upcoming Health Maintenance Date Due DTaP/Tdap/Td series (1 - Tdap) 2/12/2008 PAP AKA CERVICAL CYTOLOGY 7/3/2020 Allergies as of 10/10/2017  Review Complete On: 10/10/2017 By: Kar Garrison NP No Known Allergies Current Immunizations  Reviewed on 10/3/2017 Name Date Influenza Vaccine (Quad) PF 10/3/2017 Not reviewed this visit You Were Diagnosed With   
  
 Codes Comments Encounter for initial prescription of contraceptive pills    -  Primary ICD-10-CM: Z30.011 ICD-9-CM: V25.01 Vitals BP Pulse Temp Resp Height(growth percentile) Weight(growth percentile) 110/74 (BP 1 Location: Right arm, BP Patient Position: Sitting) 96 97.2 °F (36.2 °C) (Oral) 17 5' 2\" (1.575 m) 110 lb 9.6 oz (50.2 kg) LMP SpO2 Breastfeeding? BMI OB Status Smoking Status 10/03/2017 100% No 20.23 kg/m2 Having regular periods Former Smoker BMI and BSA Data Body Mass Index Body Surface Area  
 20.23 kg/m 2 1.48 m 2 Preferred Pharmacy Pharmacy Name Phone [facility], [address] [address] [facility] [phone] Your Updated Medication List  
  
   
This list is accurate as of: 10/10/17 11:03 AM.  Always use your most recent med list.  
  
  
  
  
 loratadine 10 mg tablet Commonly known as:  Alanda Luis Take 1 Tab by mouth daily as needed for Allergies. MONISTAT 7 2 % vaginal cream  
Generic drug:  miconazole Insert 1 Applicator into vagina nightly. norethindrone-ethinyl estradiol 1 mg-20 mcg (21)/75 mg (7) Tab Commonly known as:  Sky Coleker FE 1/20 Take 1 Tab by mouth daily. Prescriptions Sent to Pharmacy Refills  
 norethindrone-ethinyl estradiol (JUNEL FE 1/20) 1 mg-20 mcg (21)/75 mg (7) tab 4 Sig: Take 1 Tab by mouth daily. Class: Normal  
 Pharmacy: [facility] [address] [place] [address], [address] [place] [address] #: [phone] Route: Oral  
  
Follow-up Instructions Return in about 6 months (around 4/10/2018), or if symptoms worsen or fail to improve, for Hep A #2. Introducing [address]! Dear Alivia Gutierrez: Thank you for requesting a Wowo account. Our records indicate that you already have an active Wowo account. You can access your account anytime at https://[employer] Bucket [employer]. [facility]/[employer] Did you know that you can access your hospital and ER discharge instructions at any time in Wowo? You can also review all of your test results from your hospital stay or ER visit. Additional Information If you have questions, please visit the Frequently Asked Questions section of the Wowo website at https://[employer] Bucket [employer]Q. [facility]/The Bucket [employer]Q/. Remember, Wowo is NOT to be used for urgent needs. For medical emergencies, dial 911. Now available from your iPhone and Android! Please provide this summary of care documentation to your next provider. Your primary care clinician is listed as Nayeli James. If you have any questions after today's visit, please call [phone].

## 2017-10-10 NOTE — PROGRESS NOTES
Carlie Zuniga is a 29 y.o.  female and presents with    Chief Complaint   Patient presents with    Immunization/Injection    Contraception       Subjective:  Ms. Luis E Penny presents today for immunizations and she desires contraception. She states in the past she has been on Depo with bad side effects as well as the mirena and oral contraception. Ten years ago she was on oral contraception but states it was a \"low dose\" but got pregnant. She states she was taking the pill at the same time every day. She would like to try oral contraception again. LMP was on 10/3/17 and normal.       Additional Concerns: No         There is no problem list on file for this patient. Current Outpatient Prescriptions   Medication Sig Dispense Refill    loratadine (CLARITIN) 10 mg tablet Take 1 Tab by mouth daily as needed for Allergies. 30 Tab 0    miconazole (MONISTAT 7) 2 % vaginal cream Insert 1 Applicator into vagina nightly. No Known Allergies  Past Medical History:   Diagnosis Date    Abnormal Papanicolaou smear of cervix 2007     Past Surgical History:   Procedure Laterality Date    COLPOSC,CERVIX W/ADJ VAG,W/BX & CURRETAG  7/26/2017    HX GYN      cryotherapy to cervix      Family History   Problem Relation Age of Onset    Cancer Mother 52     Breast Cancer/Cervical Cancer    COPD Mother     No Known Problems Father     Cancer Maternal Aunt 44     Metastatic Cancer, unclear primary     Social History   Substance Use Topics    Smoking status: Former Smoker    Smokeless tobacco: Never Used    Alcohol use Yes      Comment: ocassionally       ROS   History obtained from the patient  General ROS: negative for - chills or fever    All other systems reviewed and are negative.       Objective:  Vitals:    10/10/17 1050   BP: 110/74   Pulse: 96   Resp: 17   Temp: 97.2 °F (36.2 °C)   TempSrc: Oral   SpO2: 100%   Weight: 110 lb 9.6 oz (50.2 kg)   Height: 5' 2\" (1.575 m)   PainSc:   0 - No pain   LMP: 09/05/2017       PE  General appearance - alert, well appearing, and in no distress  Mental status - normal mood, behavior, speech, dress, motor activity, and thought processes      LABS   Lab Results  Component Value Date/Time   WBC 4.7 10/04/2017 11:28 AM   HGB 13.6 10/04/2017 11:28 AM   HCT 41.7 10/04/2017 11:28 AM   PLATELET 190 24/27/5652 11:28 AM   MCV 92.1 10/04/2017 11:28 AM   Lab Results  Component Value Date/Time   TSH 1.61 10/04/2017 11:28 AM   T4, Free 1.1 10/04/2017 11:28 AM      Lab Results   Component Value Date/Time    Sodium 137 10/04/2017 11:28 AM    Potassium 4.4 10/04/2017 11:28 AM    Chloride 105 10/04/2017 11:28 AM    CO2 28 10/04/2017 11:28 AM    Anion gap 4 10/04/2017 11:28 AM    Glucose 80 10/04/2017 11:28 AM    BUN 9 10/04/2017 11:28 AM    Creatinine 0.64 10/04/2017 11:28 AM    BUN/Creatinine ratio 14 10/04/2017 11:28 AM    GFR est AA >60 10/04/2017 11:28 AM    GFR est non-AA >60 10/04/2017 11:28 AM    Calcium 9.6 10/04/2017 11:28 AM      10/5/2017  9:38 AM - [facility]   Component Results   Component Value Flag Ref Range Units Status   Hepatitis A Ab, IgM NEGATIVE   NEGATIVE   Final   Hep A Ab, total NEGATIVE   NEGATIVE   Final   Comment:     10/5/2017  9:48 AM - [facility]   Component Results   Component Value Flag Ref Range Units Status   Hepatitis B surface Ab 176.58  >10.0 mIU/mL Final   Hep B surface Ab Interp. POSITIVE  POS   Final   Hep B surface Ab comment      Final     10/5/2017 10:38 AM - [facility]   Component Results   Component Value Flag Ref Range Units Status   V. zoster, IgG >4000  Immune >165 index Final     Component Results   Component Value Flag Ref Range Units Status   Rubella Ab, IgG 5.75  Immune >0.99 index Final     Rubeola Ab, IgG 288.0  Immune >29.9 AU/mL Final     Mumps Abs, IgG 54.2  Immune >10.9 AU/mL Final       Assessment/Plan:    1.  Contraception- discussed potential side effects of oral contraception; patient desires OCP despite the fact she got pregnant on contraception in the past; advised patient to go to OB/GYN for other options but she declined; take pill at the same time every day; other form of contraception for the first 7 days while on the pill. Lab review: labs are reviewed, up to date and normal    Today's Visit: [address] Maintenance:   Hep A #1- given today  Tdap- given today    I have discussed the diagnosis with the patient and the intended plan as seen in the above orders. The patient has received an after-visit summary and questions were answered concerning future plans. I have discussed medication side effects and warnings with the patient as well. I have reviewed the plan of care with the patient, accepted their input and they are in agreement with the treatment goals. Follow-up Disposition:  Return in about 6 months (around 4/10/2018), or if symptoms worsen or fail to improve, for Hep A #2. More than 1/2 of this 15 minute visit was spent in counseling and coordination of care, as described above.     RAMIN Wright

## 2017-10-10 NOTE — PROGRESS NOTES
SUBJECTIVE:  Angel Steele is a 29 y.o. female who presents today for immunizations and completion of paper work. Patient would like to discuss family planning with PCP. 1. Have you been to the ER, urgent care clinic since your last visit? Hospitalized since your last visit? NO    2. Have you seen or consulted any other health care providers outside of the [address] [place] [address] since your last visit? Include any pap smears or colon screening. NO    Health Maintenance reviewed  Yes    Health Maintenance Due   Topic Date Due    DTaP/Tdap/Td series (1 - Tdap) 02/12/2008

## 2018-01-25 ENCOUNTER — OFFICE VISIT (OUTPATIENT)
Dept: FAMILY MEDICINE CLINIC | Age: 33
End: 2018-01-25

## 2018-01-25 VITALS
BODY MASS INDEX: 20.76 KG/M2 | SYSTOLIC BLOOD PRESSURE: 114 MMHG | TEMPERATURE: 97 F | RESPIRATION RATE: 19 BRPM | HEIGHT: 62 IN | HEART RATE: 84 BPM | DIASTOLIC BLOOD PRESSURE: 82 MMHG | WEIGHT: 112.8 LBS | OXYGEN SATURATION: 98 %

## 2018-01-25 DIAGNOSIS — R12 HEART BURN: ICD-10-CM

## 2018-01-25 DIAGNOSIS — R10.84 GENERALIZED ABDOMINAL PAIN: ICD-10-CM

## 2018-01-25 DIAGNOSIS — N92.6 ABNORMAL MENSES: ICD-10-CM

## 2018-01-25 DIAGNOSIS — R11.0 NAUSEA: Primary | ICD-10-CM

## 2018-01-25 LAB
HCG URINE, QL. (POC): NEGATIVE
VALID INTERNAL CONTROL?: YES

## 2018-01-25 RX ORDER — ONDANSETRON 4 MG/1
4 TABLET, FILM COATED ORAL
Qty: 12 TAB | Refills: 0 | Status: SHIPPED | OUTPATIENT
Start: 2018-01-25

## 2018-01-25 NOTE — MR AVS SNAPSHOT
[facility] 
 
 
 [address] [place] [address] [place] [address] 83 [id number] 
[phone] Patient: Paola Browning MRN: [id number] :1985 Visit Information Date & Time Provider Department Dept. Phone Encounter #  
 2018  1:00 PM Magalis Saini NP [id number] [address] [phone] Follow-up Instructions Return if symptoms worsen or fail to improve. Your Appointments 4/10/2018 10:00 AM  
Nurse [place] [address] [address]) Appt Note: Return in about 6 months (around 4/10/2018), or if symptoms worsen or fail to improve, for Hep A #2.-[place] per [facility] [address] [facility] [address] [facility] [address] [facility]  
  
   
 [address] [facility] [address] [place] [address]  
  
    
 10/3/2018  9:00 AM  
COMPLETE PHYSICAL with Magalis Saini NP [id number] [address]) Appt Note: Return in about 1 year (around 10/3/2018) for annual physical.  
 [address] [place] [address] [facility] [address] [facility]  
  
   
 [address] [facility] [address] [place] [address] Upcoming Health Maintenance Date Due  
 PAP AKA CERVICAL CYTOLOGY 7/3/2020 DTaP/Tdap/Td series (2 - Td) 10/10/2027 Allergies as of 2018  Review Complete On: 2018 By: Magalis Saini NP No Known Allergies Current Immunizations  Reviewed on 10/10/2017 Name Date Hep A Vaccine (Adult) 10/10/2017 Influenza Vaccine (Quad) PF 10/3/2017 Tdap 10/10/2017 Not reviewed this visit You Were Diagnosed With   
  
 Codes Comments Nausea    -  Primary ICD-10-CM: R11.0 ICD-9-CM: 787.02 Abnormal menses     ICD-10-CM: N92.6 ICD-9-CM: 626.9 Generalized abdominal pain     ICD-10-CM: R10.84 ICD-9-CM: 789.07 Heart burn     ICD-10-CM: R12 
ICD-9-CM: 787.1 Vitals BP Pulse Temp Resp Height(growth percentile) Weight(growth percentile) 114/82 (BP 1 Location: Left arm, BP Patient Position: Sitting) 84 97 °F (36.1 °C) (Oral) 19 5' 2\" (1.575 m) 112 lb 12.8 oz (51.2 kg) LMP SpO2 Breastfeeding? BMI OB Status Smoking Status 12/25/2017 (Approximate) 98% No 20.63 kg/m2 Having regular periods Former Smoker BMI and BSA Data Body Mass Index Body Surface Area  
 20.63 kg/m 2 1.5 m 2 Preferred Pharmacy Pharmacy Name Phone [facility], [address] [phone] Your Updated Medication List  
  
   
This list is accurate as of: 1/25/18  1:58 PM.  Always use your most recent med list.  
  
  
  
  
 loratadine 10 mg tablet Commonly known as:  Tiajuana Bile Take 1 Tab by mouth daily as needed for Allergies. MONISTAT 7 2 % vaginal cream  
Generic drug:  miconazole Insert 1 Applicator into vagina nightly. norethindrone-ethinyl estradiol 1 mg-20 mcg (21)/75 mg (7) Tab Commonly known as:  Cecillia Angles FE 1/20 Take 1 Tab by mouth daily. ondansetron hcl 4 mg tablet Commonly known as:  Keren Shingles Take 1 Tab by mouth every eight (8) hours as needed for Nausea. Prescriptions Sent to Pharmacy Refills  
 ondansetron hcl (ZOFRAN) 4 mg tablet 0 Sig: Take 1 Tab by mouth every eight (8) hours as needed for Nausea. Class: Normal  
 Pharmacy: [facility] [address] [place] [address], [address] #: [phone] Route: Oral  
  
We Performed the Following AMB POC URINE PREGNANCY TEST, VISUAL COLOR COMPARISON [05472 CPT(R)] Follow-up Instructions Return if symptoms worsen or fail to improve. Patient Instructions Gastroesophageal Reflux Disease (GERD): Care Instructions Your Care Instructions Gastroesophageal reflux disease (GERD) is the backward flow of stomach acid into the esophagus.  The esophagus is the tube that leads from your throat to your stomach. A one-way valve prevents the stomach acid from moving up into this tube. When you have GERD, this valve does not close tightly enough. If you have mild GERD symptoms including heartburn, you may be able to control the problem with antacids or over-the-counter medicine. Changing your diet, losing weight, and making other lifestyle changes can also help reduce symptoms. Follow-up care is a key part of your treatment and safety. Be sure to make and go to all appointments, and call your doctor if you are having problems. It's also a good idea to know your test results and keep a list of the medicines you take. How can you care for yourself at home? · Take your medicines exactly as prescribed. Call your doctor if you think you are having a problem with your medicine. · Your doctor may recommend over-the-counter medicine. For mild or occasional indigestion, antacids, such as Tums, Gaviscon, Mylanta, or Maalox, may help. Your doctor also may recommend over-the-counter acid reducers, such as Pepcid AC, Tagamet HB, Zantac 75, or Prilosec. Read and follow all instructions on the label. If you use these medicines often, talk with your doctor. · Change your eating habits. ¨ It's best to eat several small meals instead of two or three large meals. ¨ After you eat, wait 2 to 3 hours before you lie down. ¨ Chocolate, mint, and alcohol can make GERD worse. ¨ Spicy foods, foods that have a lot of acid (like tomatoes and oranges), and coffee can make GERD symptoms worse in some people. If your symptoms are worse after you eat a certain food, you may want to stop eating that food to see if your symptoms get better. · Do not smoke or chew tobacco. Smoking can make GERD worse. If you need help quitting, talk to your doctor about stop-smoking programs and medicines. These can increase your chances of quitting for good.  
· If you have GERD symptoms at night, raise the head of your bed 6 to 8 inches by putting the frame on blocks or placing a foam wedge under the head of your mattress. (Adding extra pillows does not work.) · Do not wear tight clothing around your middle. · Lose weight if you need to. Losing just 5 to 10 pounds can help. When should you call for help? Call your doctor now or seek immediate medical care if: 
? · You have new or different belly pain. ? · Your stools are black and tarlike or have streaks of blood. ? Watch closely for changes in your health, and be sure to contact your doctor if: 
? · Your symptoms have not improved after 2 days. ? · Food seems to catch in your throat or chest.  
Where can you learn more? Go to http://danial-patricia.info/. Enter N618 in the search box to learn more about \"Gastroesophageal Reflux Disease (GERD): Care Instructions. \" Current as of: May 12, 2017 Content Version: 11.4 © 4906-3587 [employer]. Care instructions adapted under license by [employer] (which disclaims liability or warranty for this information). If you have questions about a medical condition or this instruction, always ask your healthcare professional. [place] [address] any warranty or liability for your use of this information. Introducing [address]! Dear Zay Penny: Thank you for requesting a [employer] account. Our records indicate that you already have an active [employer] account. You can access your account anytime at [email or web address] "[email or web address]/[email or web address] Did you know that you can access your hospital and ER discharge instructions at any time in [employer]? You can also review all of your test results from your hospital stay or ER visit. Additional Information If you have questions, please visit the Frequently Asked Questions section of the [employer] website at [email or web address] "[email or web address]/. Remember, [employer] is NOT to be used for urgent needs. For medical emergencies, dial 911. Now available from your iPhone and Android! Please provide this summary of care documentation to your next provider. Your primary care clinician is listed as Roxy Crocker. If you have any questions after today's visit, please call [phone].

## 2018-01-25 NOTE — PROGRESS NOTES
Paola Browning is a 29 y.o.  female and presents with    Chief Complaint   Patient presents with    Nausea       Subjective:  Ms. Sudhakar Champion presents today with complaints of nausea. Nausea has been present for the past week but worsened the last few days. Nausea is worse at night and when she wakes up in the morning. Her LMP was on 12/25/17. She states her menses have been off since she started the OCP's. She has been taking them every day at the same time and has not missed a dose. She did take a home pregnancy test and it was negative. She has not eaten anything different. She denies diarrhea. She has had some soft stools. She has had a headache for the last few days. Headaches are located in the middle of her forehead. They were intermittent but today it has been constant. She has taken ibuprofen with little relief. She reports urine frequency and urgency about 1 week ago. She states she was having vaginal itching and used OTC monistat. She completed treatment last week and has hot had any itching since. She denies abnormal vaginal discharge. She also reports low pelvic discomfort and epigastric pain. She did have feelings of heartburn last week. Additional Concerns: No       There is no problem list on file for this patient. Current Outpatient Prescriptions   Medication Sig Dispense Refill    norethindrone-ethinyl estradiol (JUNEL FE 1/20) 1 mg-20 mcg (21)/75 mg (7) tab Take 1 Tab by mouth daily. 3 Package 4    loratadine (CLARITIN) 10 mg tablet Take 1 Tab by mouth daily as needed for Allergies. 30 Tab 0    miconazole (MONISTAT 7) 2 % vaginal cream Insert 1 Applicator into vagina nightly.        No Known Allergies  Past Medical History:   Diagnosis Date    Abnormal Papanicolaou smear of cervix 2007     Past Surgical History:   Procedure Laterality Date    HX GYN      cryotherapy to cervix     CO COLPOSC,CERVIX W/ADJ VAG,W/BX & CURRETAG  7/26/2017     Family History   Problem Relation Age of Onset    Cancer Mother 52     Breast Cancer/Cervical Cancer    COPD Mother     No Known Problems Father     Cancer Maternal Aunt 44     Metastatic Cancer, unclear primary     Social History   Substance Use Topics    Smoking status: Former Smoker    Smokeless tobacco: Never Used    Alcohol use Yes      Comment: ocassionally       ROS   History obtained from the patient  General ROS: negative for - chills or fever  ENT ROS: positive for - headaches  Respiratory ROS: no cough, shortness of breath, or wheezing  Cardiovascular ROS: no chest pain or dyspnea on exertion  Gastrointestinal ROS: positive for - abdominal pain and nausea/vomiting  Genito-Urinary ROS: no dysuria, trouble voiding, or hematuria    All other systems reviewed and are negative. Objective:  Vitals:    01/25/18 1316   BP: 114/82   Pulse: 84   Resp: 19   Temp: 97 °F (36.1 °C)   TempSrc: Oral   SpO2: 98%   Weight: 112 lb 12.8 oz (51.2 kg)   Height: 5' 2\" (1.575 m)   PainSc:   8   PainLoc: Head   LMP: 12/25/2017       PE  General appearance - alert, well appearing, and in no distress  Mental status - normal mood, behavior, speech, dress, motor activity, and thought processes  Eyes - pupils equal and reactive, extraocular eye movements intact  Chest - clear to auscultation, no wheezes, rales or rhonchi, symmetric air entry  Heart - normal rate and regular rhythm  Abdomen - tenderness noted generalized; no rebound tenderness    LABS   1/25/2018  1:57 PM - Catalina Staley LPN   Component Results   Component Value Flag Ref Range Units Status   VALID INTERNAL CONTROL POC Yes    Final   HCG urine, Ql. (POC) Negative  Negative  Final           Assessment/Plan:    1. Nausea- POC urine pregnancy negative; Zofran PRN; bland diet; return if symptoms persist    2.  Abnormal Menses-POC urine pregnancy negative; continue with OCP as prescribed;     3. Generalized Abdominal Pain/Heart Burn- discussed dietary changes; avoid acidic foods; bland diet; return if symptoms persist    Lab review: no lab studies available for review at time of visit    Today's Visit: POC Urine Pregnancy    Health Maintenance:   Up to date    I have discussed the diagnosis with the patient and the intended plan as seen in the above orders. The patient has received an after-visit summary and questions were answered concerning future plans. I have discussed medication side effects and warnings with the patient as well. I have reviewed the plan of care with the patient, accepted their input and they are in agreement with the treatment goals. Follow-up Disposition:  Return if symptoms worsen or fail to improve. More than 1/2 of this 15 minute visit was spent in counseling and coordination of care, as described above.     RAMIN Mcmahon

## 2018-01-25 NOTE — PATIENT INSTRUCTIONS

## 2018-01-25 NOTE — PROGRESS NOTES
SUBJECTIVE:  Elodia Jay is a 29 y.o. female who presents today for complaints of nausea    1. Have you been to the ER, urgent care clinic since your last visit? Hospitalized since your last visit? NO    2. Have you seen or consulted any other health care providers outside of the [address] [place] [address] since your last visit? Include any pap smears or colon screening. NO    Health Maintenance reviewed  Up to date    There are no preventive care reminders to display for this patient.

## 2018-01-30 ENCOUNTER — HOSPITAL ENCOUNTER (EMERGENCY)
Age: 33
Discharge: HOME | End: 2018-01-30
Attending: EMERGENCY MEDICINE
Payer: OTHER GOVERNMENT

## 2018-01-30 ENCOUNTER — APPOINTMENT (OUTPATIENT)
Dept: CT IMAGING | Age: 33
End: 2018-01-30
Attending: PHYSICIAN ASSISTANT
Payer: OTHER GOVERNMENT

## 2018-01-30 VITALS
SYSTOLIC BLOOD PRESSURE: 126 MMHG | BODY MASS INDEX: 20.61 KG/M2 | TEMPERATURE: 98.9 F | DIASTOLIC BLOOD PRESSURE: 83 MMHG | OXYGEN SATURATION: 100 % | WEIGHT: 112 LBS | HEIGHT: 62 IN | HEART RATE: 99 BPM | RESPIRATION RATE: 16 BRPM

## 2018-01-30 DIAGNOSIS — R10.31 ABDOMINAL PAIN, RIGHT LOWER QUADRANT: ICD-10-CM

## 2018-01-30 DIAGNOSIS — K21.9 GASTROESOPHAGEAL REFLUX DISEASE WITHOUT ESOPHAGITIS: Primary | ICD-10-CM

## 2018-01-30 LAB
ALBUMIN SERPL-MCNC: 4.2 G/DL (ref 3.4–5)
ALBUMIN/GLOB SERPL: 1.1 {RATIO} (ref 0.8–1.7)
ALP SERPL-CCNC: 52 U/L (ref 45–117)
ALT SERPL-CCNC: 11 U/L (ref 13–56)
ANION GAP SERPL CALC-SCNC: 10 MMOL/L (ref 3–18)
APPEARANCE UR: CLEAR
AST SERPL-CCNC: 11 U/L (ref 15–37)
BACTERIA URNS QL MICRO: ABNORMAL /HPF
BASOPHILS # BLD: 0 K/UL (ref 0–0.06)
BASOPHILS NFR BLD: 1 % (ref 0–2)
BILIRUB SERPL-MCNC: 2.1 MG/DL (ref 0.2–1)
BILIRUB UR QL: NEGATIVE
BUN SERPL-MCNC: 11 MG/DL (ref 7–18)
BUN/CREAT SERPL: 16 (ref 12–20)
CALCIUM SERPL-MCNC: 9.4 MG/DL (ref 8.5–10.1)
CHLORIDE SERPL-SCNC: 104 MMOL/L (ref 100–108)
CO2 SERPL-SCNC: 26 MMOL/L (ref 21–32)
COLOR UR: YELLOW
CREAT SERPL-MCNC: 0.7 MG/DL (ref 0.6–1.3)
DIFFERENTIAL METHOD BLD: ABNORMAL
EOSINOPHIL # BLD: 0 K/UL (ref 0–0.4)
EOSINOPHIL NFR BLD: 0 % (ref 0–5)
EPITH CASTS URNS QL MICRO: ABNORMAL /LPF (ref 0–5)
ERYTHROCYTE [DISTWIDTH] IN BLOOD BY AUTOMATED COUNT: 12.3 % (ref 11.6–14.5)
GLOBULIN SER CALC-MCNC: 3.7 G/DL (ref 2–4)
GLUCOSE SERPL-MCNC: 81 MG/DL (ref 74–99)
GLUCOSE UR STRIP.AUTO-MCNC: NEGATIVE MG/DL
HCG SERPL QL: NEGATIVE
HCG UR QL: NEGATIVE
HCT VFR BLD AUTO: 42.9 % (ref 35–45)
HGB BLD-MCNC: 14.7 G/DL (ref 12–16)
HGB UR QL STRIP: NEGATIVE
KETONES UR QL STRIP.AUTO: >160 MG/DL
LEUKOCYTE ESTERASE UR QL STRIP.AUTO: ABNORMAL
LIPASE SERPL-CCNC: 106 U/L (ref 73–393)
LYMPHOCYTES # BLD: 2.5 K/UL (ref 0.9–3.6)
LYMPHOCYTES NFR BLD: 29 % (ref 21–52)
MCH RBC QN AUTO: 30.7 PG (ref 24–34)
MCHC RBC AUTO-ENTMCNC: 34.3 G/DL (ref 31–37)
MCV RBC AUTO: 89.6 FL (ref 74–97)
MONOCYTES # BLD: 0.5 K/UL (ref 0.05–1.2)
MONOCYTES NFR BLD: 6 % (ref 3–10)
NEUTS SEG # BLD: 5.4 K/UL (ref 1.8–8)
NEUTS SEG NFR BLD: 64 % (ref 40–73)
NITRITE UR QL STRIP.AUTO: NEGATIVE
PH UR STRIP: 6 [PH] (ref 5–8)
PLATELET # BLD AUTO: 257 K/UL (ref 135–420)
PMV BLD AUTO: 8.9 FL (ref 9.2–11.8)
POTASSIUM SERPL-SCNC: 3.9 MMOL/L (ref 3.5–5.5)
PROT SERPL-MCNC: 7.9 G/DL (ref 6.4–8.2)
PROT UR STRIP-MCNC: ABNORMAL MG/DL
RBC # BLD AUTO: 4.79 M/UL (ref 4.2–5.3)
RBC #/AREA URNS HPF: 0 /HPF (ref 0–5)
SODIUM SERPL-SCNC: 140 MMOL/L (ref 136–145)
SP GR UR REFRACTOMETRY: 1.02 (ref 1–1.03)
UROBILINOGEN UR QL STRIP.AUTO: 1 EU/DL (ref 0.2–1)
WBC # BLD AUTO: 8.5 K/UL (ref 4.6–13.2)
WBC URNS QL MICRO: ABNORMAL /HPF (ref 0–4)

## 2018-01-30 PROCEDURE — 74177 CT ABD & PELVIS W/CONTRAST: CPT

## 2018-01-30 PROCEDURE — 85025 COMPLETE CBC W/AUTO DIFF WBC: CPT | Performed by: EMERGENCY MEDICINE

## 2018-01-30 PROCEDURE — 81001 URINALYSIS AUTO W/SCOPE: CPT

## 2018-01-30 PROCEDURE — 74011250636 HC RX REV CODE- 250/636: Performed by: PHYSICIAN ASSISTANT

## 2018-01-30 PROCEDURE — 74011636320 HC RX REV CODE- 636/320: Performed by: EMERGENCY MEDICINE

## 2018-01-30 PROCEDURE — 84703 CHORIONIC GONADOTROPIN ASSAY: CPT

## 2018-01-30 PROCEDURE — 81025 URINE PREGNANCY TEST: CPT

## 2018-01-30 PROCEDURE — 96375 TX/PRO/DX INJ NEW DRUG ADDON: CPT

## 2018-01-30 PROCEDURE — 83690 ASSAY OF LIPASE: CPT | Performed by: EMERGENCY MEDICINE

## 2018-01-30 PROCEDURE — 74011000250 HC RX REV CODE- 250: Performed by: PHYSICIAN ASSISTANT

## 2018-01-30 PROCEDURE — 74011250637 HC RX REV CODE- 250/637: Performed by: PHYSICIAN ASSISTANT

## 2018-01-30 PROCEDURE — 96361 HYDRATE IV INFUSION ADD-ON: CPT

## 2018-01-30 PROCEDURE — 99283 EMERGENCY DEPT VISIT LOW MDM: CPT

## 2018-01-30 PROCEDURE — 80053 COMPREHEN METABOLIC PANEL: CPT | Performed by: EMERGENCY MEDICINE

## 2018-01-30 PROCEDURE — 96374 THER/PROPH/DIAG INJ IV PUSH: CPT

## 2018-01-30 RX ORDER — ONDANSETRON 2 MG/ML
4 INJECTION INTRAMUSCULAR; INTRAVENOUS
Status: COMPLETED | OUTPATIENT
Start: 2018-01-30 | End: 2018-01-30

## 2018-01-30 RX ORDER — OMEPRAZOLE 20 MG/1
20 CAPSULE, DELAYED RELEASE ORAL DAILY
Qty: 30 CAP | Refills: 0 | Status: SHIPPED | OUTPATIENT
Start: 2018-01-30

## 2018-01-30 RX ORDER — MORPHINE SULFATE 2 MG/ML
2 INJECTION, SOLUTION INTRAMUSCULAR; INTRAVENOUS ONCE
Status: COMPLETED | OUTPATIENT
Start: 2018-01-30 | End: 2018-01-30

## 2018-01-30 RX ORDER — MORPHINE SULFATE 4 MG/ML
4 INJECTION, SOLUTION INTRAMUSCULAR; INTRAVENOUS
Status: DISCONTINUED | OUTPATIENT
Start: 2018-01-30 | End: 2018-01-30

## 2018-01-30 RX ADMIN — SODIUM CHLORIDE 1000 ML: 900 INJECTION, SOLUTION INTRAVENOUS at 19:15

## 2018-01-30 RX ADMIN — IOPAMIDOL 93 ML: 612 INJECTION, SOLUTION INTRAVENOUS at 19:56

## 2018-01-30 RX ADMIN — LIDOCAINE HYDROCHLORIDE 50 ML: 20 SOLUTION ORAL; TOPICAL at 19:37

## 2018-01-30 RX ADMIN — MORPHINE SULFATE 2 MG: 2 INJECTION, SOLUTION INTRAMUSCULAR; INTRAVENOUS at 19:40

## 2018-01-30 RX ADMIN — ONDANSETRON 4 MG: 2 INJECTION INTRAMUSCULAR; INTRAVENOUS at 19:39

## 2018-01-30 NOTE — ED NOTES
Provider in triage: epigastric pain and dark stools.   NAD stable  Ordered: labs  DDx: ulcer, gi bleed  Sent to (MAIN treatment area, FAST track): fast

## 2018-01-30 NOTE — ED PROVIDER NOTES
EMERGENCY DEPARTMENT HISTORY AND PHYSICAL EXAM    Date: 1/30/2018  Patient Name: Jose Black    History of Presenting Illness     Chief Complaint   Patient presents with    Abdominal Pain    Diarrhea         History Provided By: Patient    Chief Complaint: Abdominal pain and Diarrhea   Duration: 2 Days  Timing:  Acute  Location: Epigastric and RLQ pain  Quality: Sharp  Severity: 10 out of 10  Modifying Factors: Worse when laying flat and nothing makes it better   Associated Symptoms: nausea and diarrhea       Additional History (Context): Jose Black is a 29 y.o. female with no medical history presents today for a 2 day history of abdominal pain located in the epigastric and RLQ quadrant. She states pain was sudden and has progressed to 10/10 pain. Reports three episodes of diarrhea with this. Denies history of acid reflux or  GERD. Denies abdominal surgeries. Reports nausea without vomiting. Denies chance of preg. Denies fever. Reports dark stools, but denies blood in stool. Denies CP, and SOB. PCP: Michael Vaughn NP    Current Facility-Administered Medications   Medication Dose Route Frequency Provider Last Rate Last Dose    sodium chloride 0.9 % bolus infusion 1,000 mL  1,000 mL IntraVENous ONCE [place], [place]        [facility]ron [facility] 4 mg  4 mg IntraVENous NOW [place], [place]        morphine injection 2 mg  2 mg IntraVENous ONCE [place], [place]        mylanta/donnatal/viscous lidocaine (GI COCKTAIL)  50 mL Oral ONCE [place], [place]         Current Outpatient Prescriptions   Medication Sig Dispense Refill    ondansetron hcl (ZOFRAN) 4 mg tablet Take 1 Tab by mouth every eight (8) hours as needed for Nausea. 12 Tab 0    norethindrone-ethinyl estradiol (JUNEL FE 1/20) 1 mg-20 mcg (21)/75 mg (7) tab Take 1 Tab by mouth daily. 3 Package 4    loratadine (CLARITIN) 10 mg tablet Take 1 Tab by mouth daily as needed for Allergies.  30 Tab 0       Past History     Past Medical History:  Past Medical History:   Diagnosis Date    Abnormal Papanicolaou smear of cervix 2007       Past Surgical History:  Past Surgical History:   Procedure Laterality Date    HX GYN      cryotherapy to cervix     [place] COLPOSC,CERVIX W/ADJ VAG,W/BX & CURRETAG  7/26/2017       Family History:  Family History   Problem Relation Age of Onset    Cancer Mother 52     Breast Cancer/Cervical Cancer    COPD Mother     No Known Problems Father     Cancer Maternal Aunt 36     Metastatic Cancer, unclear primary       Social History:  Social History   Substance Use Topics    Smoking status: Former Smoker    Smokeless tobacco: Never Used    Alcohol use Yes      Comment: ocassionally       Allergies:  No Known Allergies      Review of Systems   Review of Systems   Constitutional: Negative for chills, diaphoresis, fatigue and fever. HENT: Negative for congestion, rhinorrhea and sore throat. Respiratory: Negative for cough and shortness of breath. Cardiovascular: Negative for chest pain. Gastrointestinal: Positive for abdominal pain, diarrhea and nausea. Negative for blood in stool, constipation and vomiting. Genitourinary: Negative for dysuria, frequency and hematuria. Musculoskeletal: Negative for back pain and myalgias. Skin: Negative for rash and wound. Neurological: Negative for dizziness and headaches. All other systems reviewed and are negative. All Other Systems Negative  Physical Exam     Vitals:    01/30/18 1813   BP: 126/83   Pulse: 99   Resp: 16   Temp: 98.9 °F (37.2 °C)   SpO2: 100%   Weight: 50.8 kg (112 lb)   Height: 5' 2\" (1.575 m)     Physical Exam   Constitutional: She is oriented to person, place, and time. She appears well-developed and well-nourished. No distress. HENT:   Head: Normocephalic and atraumatic. Eyes: Conjunctivae are normal.   Neck: Normal range of motion. Neck supple. Cardiovascular: Normal rate, regular rhythm and normal heart sounds.     Pulmonary/Chest: Effort normal and breath sounds normal. No respiratory distress. She exhibits no tenderness. Abdominal: Soft. Normal appearance and bowel sounds are normal. She exhibits no distension. There is tenderness in the right lower quadrant and epigastric area. There is guarding and tenderness at McBurney's point. There is no rigidity, no rebound, no CVA tenderness and negative Reed's sign. Musculoskeletal: She exhibits no edema or deformity. Neurological: She is alert and oriented to person, place, and time. Skin: Skin is warm and dry. She is not diaphoretic. Psychiatric: She has a normal mood and affect. Her behavior is normal.   Nursing note and vitals reviewed. Diagnostic Study Results     Labs -     Recent Results (from the past 12 hour(s))   URINALYSIS W/ RFLX MICROSCOPIC    Collection Time: 01/30/18  6:39 PM   Result Value Ref Range    Color YELLOW      Appearance CLEAR      Specific gravity 1.025 1.005 - 1.030      pH (UA) 6.0 5.0 - 8.0      Protein TRACE (A) NEG mg/dL    Glucose NEGATIVE  NEG mg/dL    Ketone >160 (A) NEG mg/dL    Bilirubin NEGATIVE  NEG      Blood NEGATIVE  NEG      Urobilinogen 1.0 0.2 - 1.0 EU/dL    Nitrites NEGATIVE  NEG      Leukocyte Esterase TRACE (A) NEG     URINE MICROSCOPIC ONLY    Collection Time: 01/30/18  6:39 PM   Result Value Ref Range    WBC 4 to 6 0 - 4 /hpf    RBC 0 0 - 5 /hpf    Epithelial cells FEW 0 - 5 /lpf    Bacteria 1+ (A) NEG /hpf   CBC WITH AUTOMATED DIFF    Collection Time: 01/30/18  6:50 PM   Result Value Ref Range    WBC 8.5 4.6 - 13.2 K/uL    RBC 4.79 4.20 - 5.30 M/uL    HGB 14.7 12.0 - 16.0 g/dL    HCT 42.9 35.0 - 45.0 %    MCV 89.6 74.0 - 97.0 FL    MCH 30.7 24.0 - 34.0 PG    MCHC 34.3 31.0 - 37.0 g/dL    RDW 12.3 11.6 - 14.5 %    PLATELET 590 758 - 768 K/uL    MPV 8.9 (L) 9.2 - 11.8 FL    NEUTROPHILS 64 40 - 73 %    LYMPHOCYTES 29 21 - 52 %    MONOCYTES 6 3 - 10 %    EOSINOPHILS 0 0 - 5 %    BASOPHILS 1 0 - 2 %    ABS.  NEUTROPHILS 5.4 1.8 - 8.0 K/UL    ABS. LYMPHOCYTES 2.5 0.9 - 3.6 K/UL    ABS. MONOCYTES 0.5 0.05 - 1.2 K/UL    ABS. EOSINOPHILS 0.0 0.0 - 0.4 K/UL    ABS. BASOPHILS 0.0 0.0 - 0.06 K/UL    DF AUTOMATED     METABOLIC PANEL, COMPREHENSIVE    Collection Time: 01/30/18  6:50 PM   Result Value Ref Range    Sodium 140 136 - 145 mmol/L    Potassium 3.9 3.5 - 5.5 mmol/L    Chloride 104 100 - 108 mmol/L    CO2 26 21 - 32 mmol/L    Anion gap 10 3.0 - 18 mmol/L    Glucose 81 74 - 99 mg/dL    BUN 11 7.0 - 18 MG/DL    Creatinine 0.70 0.6 - 1.3 MG/DL    BUN/Creatinine ratio 16 12 - 20      GFR est AA >60 >60 ml/min/1.73m2    GFR est non-AA >60 >60 ml/min/1.73m2    Calcium 9.4 8.5 - 10.1 MG/DL    Bilirubin, total 2.1 (H) 0.2 - 1.0 MG/DL    ALT (SGPT) 11 (L) 13 - 56 U/L    AST (SGOT) 11 (L) 15 - 37 U/L    Alk. phosphatase 52 45 - 117 U/L    Protein, total 7.9 6.4 - 8.2 g/dL    Albumin 4.2 3.4 - 5.0 g/dL    Globulin 3.7 2.0 - 4.0 g/dL    A-G Ratio 1.1 0.8 - 1.7     LIPASE    Collection Time: 01/30/18  6:50 PM   Result Value Ref Range    Lipase 106 73 - 393 U/L   HCG QL SERUM    Collection Time: 01/30/18  6:50 PM   Result Value Ref Range    HCG, Ql. NEGATIVE  NEG     HCG URINE, QL. - POC    Collection Time: 01/30/18  7:20 PM   Result Value Ref Range    Pregnancy test,urine (POC) NEGATIVE  NEG         Radiologic Studies -   CT ABD PELV W CONT    (Results Pending)   Wet read- No appendicitis although appendix could not be fully visualized   CT Results  (Last 48 hours)    None        CXR Results  (Last 48 hours)    None            Medical Decision Making   I am the first provider for this patient. I reviewed the vital signs, available nursing notes, past medical history, past surgical history, family history and social history. Vital Signs-Reviewed the patient's vital signs.       Pulse Oximetry Analysis - 100% on RA      Records Reviewed: Nursing Notes and Old Medical Records    Procedures:  Procedures none     Provider Notes (Medical Decision Making): Differential: appendicitis, gastroenteritis, cholecystitis, GERD    Plan: Will order GI cocktail, pain medication, basic labs, UA, and ct of abd and pelvis. MED RECONCILIATION:  Current Facility-Administered Medications   Medication Dose Route Frequency    sodium chloride 0.9 % bolus infusion 1,000 mL  1,000 mL IntraVENous ONCE    ondansetron (ZOFRAN) injection 4 mg  4 mg IntraVENous NOW    morphine injection 2 mg  2 mg IntraVENous ONCE    mylanta/donnatal/viscous lidocaine (GI COCKTAIL)  50 mL Oral ONCE     Current Outpatient Prescriptions   Medication Sig    ondansetron hcl (ZOFRAN) 4 mg tablet Take 1 Tab by mouth every eight (8) hours as needed for Nausea.  norethindrone-ethinyl estradiol (JUNEL FE 1/20) 1 mg-20 mcg (21)/75 mg (7) tab Take 1 Tab by mouth daily.  loratadine (CLARITIN) 10 mg tablet Take 1 Tab by mouth daily as needed for Allergies. Disposition:  Home     DISCHARGE NOTE:   Pt has been reexamined and states GI cocktail helped significantly. Patient has no new complaints, changes, or physical findings. Care plan outlined and precautions discussed. Results of labs and CT were reviewed with the patient. All medications were reviewed with the patient; will d/c home with course of PPI. All of pt's questions and concerns were addressed. Patient was instructed and agrees to follow up with PCP in 2 days, as well as to return to the ED upon further deterioration. Patient is ready to go home. Follow-up Information     Follow up With Details Comments Contact Info    Americo Gallagher NP Schedule an appointment as soon as possible for a visit in 2 days  [address]  [address] [facility]  [phone]            Current Discharge Medication List      START taking these medications    Details   omeprazole (PRILOSEC) 20 mg capsule Take 1 Cap by mouth daily. Qty: 30 Cap, Refills: 0             Diagnosis     Clinical Impression:   1.  Gastroesophageal reflux disease without esophagitis    2.  Abdominal pain, right lower quadrant

## 2018-01-31 NOTE — DISCHARGE INSTRUCTIONS

## 2018-01-31 NOTE — ED NOTES
Purposeful rounding completed:    Side rails up x 1:  YES  Bed in low position and wheels locked: YES  Call bell within reach: YES  Comfort addressed: YES    Toileting needs addressed: YES  Plan of care reviewed/updated with patient and or family members: YES  IV site assessed: YES  Pain assessed and addressed: YES, 3

## 2018-02-15 ENCOUNTER — HOSPITAL ENCOUNTER (OUTPATIENT)
Dept: LAB | Age: 33
Discharge: HOME | End: 2018-02-15
Payer: OTHER GOVERNMENT

## 2018-02-15 ENCOUNTER — OFFICE VISIT (OUTPATIENT)
Dept: FAMILY MEDICINE CLINIC | Age: 33
End: 2018-02-15

## 2018-02-15 VITALS
WEIGHT: 111.2 LBS | HEIGHT: 62 IN | DIASTOLIC BLOOD PRESSURE: 57 MMHG | BODY MASS INDEX: 20.46 KG/M2 | HEART RATE: 97 BPM | SYSTOLIC BLOOD PRESSURE: 92 MMHG | OXYGEN SATURATION: 98 % | TEMPERATURE: 97.2 F | RESPIRATION RATE: 19 BRPM

## 2018-02-15 DIAGNOSIS — R35.0 URINE FREQUENCY: ICD-10-CM

## 2018-02-15 DIAGNOSIS — R30.9 PAIN PASSING URINE: ICD-10-CM

## 2018-02-15 DIAGNOSIS — R82.90 ABNORMAL URINALYSIS: ICD-10-CM

## 2018-02-15 DIAGNOSIS — N30.01 ACUTE CYSTITIS WITH HEMATURIA: Primary | ICD-10-CM

## 2018-02-15 LAB
BILIRUB UR QL STRIP: NORMAL
GLUCOSE UR-MCNC: NORMAL MG/DL
KETONES P FAST UR STRIP-MCNC: NORMAL MG/DL
PH UR STRIP: 5 [PH] (ref 4.6–8)
PROT UR QL STRIP: NORMAL
SP GR UR STRIP: 1.02 (ref 1–1.03)
UA UROBILINOGEN AMB POC: NORMAL (ref 0.2–1)
URINALYSIS CLARITY POC: CLEAR
URINALYSIS COLOR POC: NORMAL
URINE BLOOD POC: NORMAL
URINE LEUKOCYTES POC: NORMAL
URINE NITRITES POC: POSITIVE

## 2018-02-15 PROCEDURE — 87086 URINE CULTURE/COLONY COUNT: CPT | Performed by: NURSE PRACTITIONER

## 2018-02-15 PROCEDURE — 87186 SC STD MICRODIL/AGAR DIL: CPT | Performed by: NURSE PRACTITIONER

## 2018-02-15 PROCEDURE — 87077 CULTURE AEROBIC IDENTIFY: CPT | Performed by: NURSE PRACTITIONER

## 2018-02-15 RX ORDER — PHENAZOPYRIDINE HYDROCHLORIDE 100 MG/1
TABLET, FILM COATED ORAL
COMMUNITY
End: 2018-04-23 | Stop reason: ALTCHOICE

## 2018-02-15 RX ORDER — SULFAMETHOXAZOLE AND TRIMETHOPRIM 800; 160 MG/1; MG/1
1 TABLET ORAL 2 TIMES DAILY
Qty: 20 TAB | Refills: 0 | Status: SHIPPED | OUTPATIENT
Start: 2018-02-15 | End: 2018-02-25

## 2018-02-15 RX ORDER — CALC/MAG/B COMPLEX/D3/HERB 61
TABLET ORAL
COMMUNITY

## 2018-02-15 NOTE — PATIENT INSTRUCTIONS

## 2018-02-15 NOTE — PROGRESS NOTES
Jamir Mccloud is a 34 y.o.  female and presents with    Chief Complaint   Patient presents with    Urinary Pain       Subjective:  Ms. Idalia Kulkarni presents today with complaints of nausea, blood in urine, urine frequency, and burning. Symptoms started yesterday but worsened overnight. She denies fever and chills but reports sweats. She reports right flank pain with urination and bladder tenderness. She denies vaginal discharge. She started taking left over pyridium for urinary discomfort with little relief. She had 2 documented UTI's. One in November- treated with Cipro and one in December- treated with Macrobid. Both times she felt like her symptoms went away. She states she has a history of kidney infections starting from the age of 13. There was a period of time when she didn't have UTI's but then 3 years ago after the birth of her daughter she started having recurrent UTI's again. Additional Concerns: No      There is no problem list on file for this patient. Current Outpatient Prescriptions   Medication Sig Dispense Refill    phenazopyridine (PYRIDIUM) 100 mg tablet Take  by mouth three (3) times daily (after meals).  lansoprazole (PREVACID) 15 mg capsule Take  by mouth Daily (before breakfast).  omeprazole (PRILOSEC) 20 mg capsule Take 1 Cap by mouth daily. 30 Cap 0    ondansetron hcl (ZOFRAN) 4 mg tablet Take 1 Tab by mouth every eight (8) hours as needed for Nausea. 12 Tab 0    norethindrone-ethinyl estradiol (JUNEL FE 1/20) 1 mg-20 mcg (21)/75 mg (7) tab Take 1 Tab by mouth daily. 3 Package 4    loratadine (CLARITIN) 10 mg tablet Take 1 Tab by mouth daily as needed for Allergies.  30 Tab 0     No Known Allergies  Past Medical History:   Diagnosis Date    Abnormal Papanicolaou smear of cervix 2007     Past Surgical History:   Procedure Laterality Date    HX GYN      cryotherapy to cervix     [place] COLPOSC,CERVIX W/ADJ VAG,W/BX & CURRETAG  7/26/2017     Family History Problem Relation Age of Onset    Cancer Mother 52     Breast Cancer/Cervical Cancer    COPD Mother     No Known Problems Father     Cancer Maternal Aunt 44     Metastatic Cancer, unclear primary     Social History   Substance Use Topics    Smoking status: Former Smoker    Smokeless tobacco: Never Used    Alcohol use Yes      Comment: ocassionally       ROS   History obtained from the patient  General ROS: negative for - chills or fever  Genito-Urinary ROS: positive for - hematuria and urinary frequency/urgency    All other systems reviewed and are negative. Objective:  Vitals:    02/15/18 0919   BP: 92/57   Pulse: 97   Resp: 19   Temp: 97.2 °F (36.2 °C)   TempSrc: Oral   SpO2: 98%   Weight: 111 lb 3.2 oz (50.4 kg)   Height: 5' 2\" (1.575 m)   PainSc:   7   PainLoc: Vagina   LMP: 01/28/2018       PE  General appearance - alert, well appearing, and in no distress  Mental status - normal mood, behavior, speech, dress, motor activity, and thought processes  Chest - clear to auscultation, no wheezes, rales or rhonchi, symmetric air entry  Heart - normal rate and regular rhythm  Abdomen - tenderness noted suprapubic  no CVA tenderness      LABS   2/15/2018  9:22 AM - Abron Ill, LPN   Component Results   Component Value Flag Ref Range Units Status   Color (UA POC) Orange    Final   Clarity (UA POC) Clear    Final   Glucose (UA POC) Trace  Negative  Final   Bilirubin (UA POC) 1+  Negative  Final   Ketones (UA POC) 1+  Negative  Final   Specific gravity (UA POC) 1.020  1.001 - 1.035  Final   Blood (UA POC) 2+  Negative  Final   pH (UA POC) 5.0  4.6 - 8.0  Final   Protein (UA POC) 2+  Negative  Final   Urobilinogen (UA POC) 2 mg/dL  0.2 - 1  Final   Nitrites (UA POC) Positive  Negative  Final   Leukocyte esterase (UA POC) 3+  Negative  Final         Assessment/Plan:    1.  Acute Cystitis w/ Hematuria- Bactrim DS 1 tab PO BID x 10 days; continue with Pyridium; increase water intake; cranberry juice; return if symptoms persist; urine for culture    Lab review: no lab studies available for review at time of visit      Today's Visit: POC UA, Urine Culture, Bactrim     Health Maintenance:   Up to date    I have discussed the diagnosis with the patient and the intended plan as seen in the above orders. The patient has received an after-visit summary and questions were answered concerning future plans. I have discussed medication side effects and warnings with the patient as well. I have reviewed the plan of care with the patient, accepted their input and they are in agreement with the treatment goals. Follow-up Disposition:  Return if symptoms worsen or fail to improve. More than 1/2 of this 15 minute visit was spent in counseling and coordination of care, as described above.     RAMIN Sharpe

## 2018-02-15 NOTE — PROGRESS NOTES
SUBJECTIVE:  Jo Sorenson is a 34 y.o. female who presents today burning and pain with urination, and frequent urination      1. Have you been to the ER, urgent care clinic since your last visit? Hospitalized since your last visit? NO    2. Have you seen or consulted any other health care providers outside of the [address] [place] [address] since your last visit? Include any pap smears or colon screening. NO    Health Maintenance reviewed Up to date

## 2018-02-15 NOTE — MR AVS SNAPSHOT
[facility] 
 
 
 [address] [facility] [address] [place] [id number] 
[phone] Patient: Lolis Long MRN: [id number] :[id number] Visit Information Date & Time Provider Department Dept. Phone Encounter #  
 2/15/2018  9:00 AM Chicho Lazaro, [address] [facility] [phone] Follow-up Instructions Return if symptoms worsen or fail to improve. Your Appointments 4/10/2018 10:00 AM  
Nurse Visit with [place]icho [place]azaro [place] [address] [address]) Appt Note: Return in about 6 months (around 4/10/2018), or if symptoms worsen or fail to improve, for Hep A #2.-NV per [facility] [address] [facility] [address] [place] [address] [phone] [place]  
  
   
 [id number] [place] [address] [address] [place] [address]  
  
    
 10/3/2018  9:00 AM  
COMPLETE PHYSICAL with Chicho Lazaro NP [address] [address]) Appt Note: Return in about 1 year (around 10/3/2018) for annual physical.  
 [address] [facility] [address] [facility] [phone] [place]  
  
   
 [id number] [place] [address] [address] [place] [address] Upcoming Health Maintenance Date Due  
 PAP AKA CERVICAL CYTOLOGY 7/3/2020 DTaP/Tdap/Td series (2 - Td) 10/10/2027 Allergies as of 2/15/2018  Review Complete On: 2/15/2018 By: Chicho Lazaro NP No Known Allergies Current Immunizations  Reviewed on 10/10/2017 Name Date Hep A Vaccine (Adult) 10/10/2017 Influenza Vaccine (Quad) PF 10/3/2017 Tdap 10/10/2017 Not reviewed this visit You Were Diagnosed With   
  
 Codes Comments Acute cystitis with hematuria    -  Primary ICD-10-CM: N30.01 
ICD-9-CM: 595.0 Pain passing urine     ICD-10-CM: R30.9 ICD-9-CM: 788.1 Urine frequency     ICD-10-CM: R35.0 ICD-9-CM: 788.41 Abnormal urinalysis     ICD-10-CM: R82.90 ICD-9-CM: 791.9 Vitals BP Pulse Temp Resp Height(growth percentile) Weight(growth percentile) 92/57 (BP 1 Location: Left arm, BP Patient Position: Sitting) 97 97.2 °F (36.2 °C) (Oral) 19 5' 2\" (1.575 m) 111 lb 3.2 oz (50.4 kg) LMP SpO2 Breastfeeding? BMI OB Status Smoking Status 01/28/2018 (Approximate) 98% No 20.34 kg/m2 Having regular periods Former Smoker BMI and BSA Data Body Mass Index Body Surface Area  
 20.34 kg/m 2 1.48 m 2 Preferred Pharmacy Pharmacy Name Phone [facility], [address] [phone] Your Updated Medication List  
  
   
This list is accurate as of: 2/15/18  9:35 AM.  Always use your most recent med list.  
  
  
  
  
 lansoprazole 15 mg capsule Commonly known as:  PREVACID Take  by mouth Daily (before breakfast). loratadine 10 mg tablet Commonly known as:  Navi Genera Take 1 Tab by mouth daily as needed for Allergies. norethindrone-ethinyl estradiol 1 mg-20 mcg (21)/75 mg (7) Tab Commonly known as:  Natalie Mason FE 1/20 Take 1 Tab by mouth daily. omeprazole 20 mg capsule Commonly known as:  PRILOSEC Take 1 Cap by mouth daily. ondansetron hcl 4 mg tablet Commonly known as:  Deboraha Belling Take 1 Tab by mouth every eight (8) hours as needed for Nausea. phenazopyridine 100 mg tablet Commonly known as:  PYRIDIUM Take  by mouth three (3) times daily (after meals). trimethoprim-sulfamethoxazole 160-800 mg per tablet Commonly known as:  BACTRIM DS, SEPTRA DS Take 1 Tab by mouth two (2) times a day for 10 days. Prescriptions Sent to Pharmacy Refills  
 trimethoprim-sulfamethoxazole (BACTRIM DS, SEPTRA DS) 160-800 mg per tablet 0 Sig: Take 1 Tab by mouth two (2) times a day for 10 days. Class: Normal  
 Pharmacy: [facility] [address] [place] [address], [address] #: [phone] Route: Oral  
  
We Performed the Following AMB POC URINALYSIS DIP STICK AUTO W/O MICRO [56288 CPT(R)] Follow-up Instructions Return if symptoms worsen or fail to improve. To-Do List   
 02/15/2018 Microbiology:  CULTURE, URINE Patient Instructions Urinary Tract Infection in Women: Care Instructions Your Care Instructions A urinary tract infection, or UTI, is a general term for an infection anywhere between the kidneys and the urethra (where urine comes out). Most UTIs are bladder infections. They often cause pain or burning when you urinate. UTIs are caused by bacteria and can be cured with antibiotics. Be sure to complete your treatment so that the infection goes away. Follow-up care is a key part of your treatment and safety. Be sure to make and go to all appointments, and call your doctor if you are having problems. It's also a good idea to know your test results and keep a list of the medicines you take. How can you care for yourself at home? · Take your antibiotics as directed. Do not stop taking them just because you feel better. You need to take the full course of antibiotics. · Drink extra water and other fluids for the next day or two. This may help wash out the bacteria that are causing the infection. (If you have kidney, heart, or liver disease and have to limit fluids, talk with your doctor before you increase your fluid intake.) · Avoid drinks that are carbonated or have caffeine. They can irritate the bladder. · Urinate often. Try to empty your bladder each time. · To relieve pain, take a hot bath or lay a heating pad set on low over your lower belly or genital area. Never go to sleep with a heating pad in place. To prevent UTIs · Drink plenty of water each day. This helps you urinate often, which clears bacteria from your system.  (If you have kidney, heart, or liver disease and have to limit fluids, talk with your doctor before you increase your fluid intake.) · Urinate when you need to. · Urinate right after you have sex. · Change sanitary pads often. · Avoid douches, bubble baths, feminine hygiene sprays, and other feminine hygiene products that have deodorants. · After going to the bathroom, wipe from front to back. When should you call for help? Call your doctor now or seek immediate medical care if: 
? · Symptoms such as fever, chills, nausea, or vomiting get worse or appear for the first time. ? · You have new pain in your back just below your rib cage. This is called flank pain. ? · There is new blood or pus in your urine. ? · You have any problems with your antibiotic medicine. ? Watch closely for changes in your health, and be sure to contact your doctor if: 
? · You are not getting better after taking an antibiotic for 2 days. ? · Your symptoms go away but then come back. Where can you learn more? Go to http://danial-patricia.info/. Enter I043 in the search box to learn more about \"Urinary Tract Infection in Women: Care Instructions. \" Current as of: May 12, 2017 Content Version: 11.4 © 0750-0853 [employer]. Care instructions adapted under license by [employer] (which disclaims liability or warranty for this information). If you have questions about a medical condition or this instruction, always ask your healthcare professional. [place] [address] any warranty or liability for your use of this information. Introducing \[facility]\"" & HEALTH SERVICES! Dear Glenda Sanderson: Thank you for requesting a NaHere account. Our records indicate that you already have an active NaHere account. You can access your account anytime at [email or web address] Did you know that you can access your hospital and ER discharge instructions at any time in NaHere? You can also review all of your test results from your hospital stay or ER visit. Additional Information If you have questions, please visit the Frequently Asked Questions section of the Care[employer]ply website at [email or web address] Remember, CareSimply is NOT to be used for urgent needs. For medical emergencies, dial 911. Now available from your iPhone and Android! Please provide this summary of care documentation to your next provider. Your primary care clinician is listed as Sunil Bucio. If you have any questions after today's visit, please call [phone].

## 2018-02-17 LAB
BACTERIA SPEC CULT: ABNORMAL
SERVICE CMNT-IMP: ABNORMAL

## 2018-02-19 ENCOUNTER — TELEPHONE (OUTPATIENT)
Dept: FAMILY MEDICINE CLINIC | Age: 33
End: 2018-02-19

## 2018-02-19 NOTE — TELEPHONE ENCOUNTER
Called patient to discuss urine culture results. She did not answer. VM had her name and voice. Left detailed information making her aware that urine was positive for bacteria and to continue bactrim as previously prescribed. Patient to call office if she has any additional questions or concerns.

## 2018-03-20 ENCOUNTER — OFFICE VISIT (OUTPATIENT)
Dept: FAMILY MEDICINE CLINIC | Age: 33
End: 2018-03-20

## 2018-03-20 VITALS
OXYGEN SATURATION: 99 % | DIASTOLIC BLOOD PRESSURE: 65 MMHG | RESPIRATION RATE: 19 BRPM | SYSTOLIC BLOOD PRESSURE: 107 MMHG | WEIGHT: 109.8 LBS | HEART RATE: 124 BPM | TEMPERATURE: 98.5 F | BODY MASS INDEX: 20.2 KG/M2 | HEIGHT: 62 IN

## 2018-03-20 DIAGNOSIS — J02.9 SORE THROAT: ICD-10-CM

## 2018-03-20 DIAGNOSIS — R52 BODY ACHES: ICD-10-CM

## 2018-03-20 DIAGNOSIS — J06.9 VIRAL UPPER RESPIRATORY ILLNESS: Primary | ICD-10-CM

## 2018-03-20 DIAGNOSIS — R50.9 FEVER, UNSPECIFIED FEVER CAUSE: ICD-10-CM

## 2018-03-20 LAB
QUICKVUE INFLUENZA TEST: NEGATIVE
S PYO AG THROAT QL: NEGATIVE
VALID INTERNAL CONTROL?: YES
VALID INTERNAL CONTROL?: YES

## 2018-03-20 RX ORDER — AMOXICILLIN 500 MG/1
CAPSULE ORAL
COMMUNITY
Start: 2018-03-10 | End: 2018-04-23 | Stop reason: ALTCHOICE

## 2018-03-20 NOTE — LETTER
NOTIFICATION RETURN TO WORK  
 
3/20/2018 11:57 AM 
 
Ms. Davion Ramirez [address] [place] [phone] To Whom It May Concern: 
 
Davion Ramirez is currently under the care of [address] [place] [address]. She will return to work on: March 22, 2018 If there are questions or concerns please have the patient contact our office.  
 
 
 
Sincerely, 
 
 
Anita Solis NP

## 2018-03-20 NOTE — PROGRESS NOTES
Chief Complaint:  Chills  Body ache  Nausea  Sore throat  Fever  Nasal congestion      SUBJECTIVE:    Jo Sorenson is a 34 y.o. female who presents today for c/o of chills and body aches fever and sore throat since yesterday. Patient c/o of nausea as well as nasal congestion. 1. Have you been to the ER, urgent care clinic since your last visit? Hospitalized since your last visit? NO    2. Have you seen or consulted any other health care providers outside of the [address] [place] [address] since your last visit? Include any pap smears or colon screening. NO    Health Maintenance reviewed  Up to date    There are no preventive care reminders to display for this patient.

## 2018-03-20 NOTE — MR AVS SNAPSHOT
[facility] 
 
 
 [address] [facility] [address] [place] [phone] 
[phone] Patient: Jonel Yadav MRN: [id number] :1985 Visit Information Date & Time Provider Department Dept. Phone Encounter #  
 3/20/2018 11:30 AM Roxy Crocker, [facility] [id number] Follow-up Instructions Return if symptoms worsen or fail to improve. Your Appointments 4/10/2018 10:00 AM  
[place] [id number] [address]) Appt Note: Return in about 6 months (around 4/10/2018), or if symptoms worsen or fail to improve, for Hep A #2.-NV per [facility] [id number] [facility] [address] [facility] 83 [address] [facility]  
  
   
 [address] [facility] [address]  
  
    
 10/3/2018  9:00 AM  
Complete Physical with Roxy Crocker NP [address] [address]) Appt Note: Return in about 1 year (around 10/3/2018) for annual physical.  
 [address] [facility] [address] [facility] 83 [address] [facility]  
  
   
 [address] [facility] [address] Upcoming Health Maintenance Date Due  
 PAP AKA CERVICAL CYTOLOGY 7/3/2020 DTaP/Tdap/Td series (2 - Td) 10/10/2027 Allergies as of 3/20/2018  Review Complete On: 3/20/2018 By: Roxy Crocker NP No Known Allergies Current Immunizations  Reviewed on 10/10/2017 Name Date Hep A Vaccine (Adult) 10/10/2017 Influenza Vaccine (Quad) PF 10/3/2017 Tdap 10/10/2017 Not reviewed this visit You Were Diagnosed With   
  
 Codes Comments Viral upper respiratory illness    -  Primary ICD-10-CM: J06.9, B97.89 ICD-9-CM: 465.9 Fever, unspecified fever cause     ICD-10-CM: R50.9 ICD-9-CM: 780.60 Body aches     ICD-10-CM: R52 ICD-9-CM: 780.96 Sore throat     ICD-10-CM: J02.9 ICD-9-CM: 501 Vitals BP Pulse Temp Resp Height(growth percentile) Weight(growth percentile) 107/65 (BP 1 Location: Right arm, BP Patient Position: Sitting) (!) 124 98.5 °F (36.9 °C) (Oral) 19 5' 2\" (1.575 m) 109 lb 12.8 oz (49.8 kg) LMP SpO2 Breastfeeding? BMI OB Status Smoking Status 03/01/2018 (Approximate) 99% No 20.08 kg/m2 Having regular periods Former Smoker Vitals History BMI and BSA Data Body Mass Index Body Surface Area 20.08 kg/m 2 1.48 m 2 Preferred Pharmacy Pharmacy Name Phone [place] [address] [facility] [address] [facility] [phone] Your Updated Medication List  
  
   
This list is accurate as of 3/20/18 11:58 AM.  Always use your most recent med list.  
  
  
  
  
 amoxicillin 500 mg capsule Commonly known as:  AMOXIL  
  
 lansoprazole 15 mg capsule Commonly known as:  PREVACID Take  by mouth Daily (before breakfast). loratadine 10 mg tablet Commonly known as:  Casimer Spaulding Take 1 Tab by mouth daily as needed for Allergies. norethindrone-ethinyl estradiol 1 mg-20 mcg (21)/75 mg (7) Tab Commonly known as:  Carloz Settles FE 1/20 Take 1 Tab by mouth daily. omeprazole 20 mg capsule Commonly known as:  PRILOSEC Take 1 Cap by mouth daily. ondansetron hcl 4 mg tablet Commonly known as:  Finis Bunde Take 1 Tab by mouth every eight (8) hours as needed for Nausea. phenazopyridine 100 mg tablet Commonly known as:  PYRIDIUM Take  by mouth three (3) times daily (after meals). We Performed the Following AMB POC RAPID INFLUENZA TEST [44984 CPT(R)] AMB POC RAPID STREP A [32752 CPT(R)] Follow-up Instructions Return if symptoms worsen or fail to improve. Patient Instructions Viral Respiratory Infection: Care Instructions Your Care Instructions Viruses are very small organisms.  They grow in number after they enter your body. There are many types that cause different illnesses, such as colds and the mumps. The symptoms of a viral respiratory infection often start quickly. They include a fever, sore throat, and runny nose. You may also just not feel well. Or you may not want to eat much. Most viral respiratory infections are not serious. They usually get better with time and self-care. Antibiotics are not used to treat a viral infection. That's because antibiotics will not help cure a viral illness. In some cases, antiviral medicine can help your body fight a serious viral infection. Follow-up care is a key part of your treatment and safety. Be sure to make and go to all appointments, and call your doctor if you are having problems. It's also a good idea to know your test results and keep a list of the medicines you take. How can you care for yourself at home? · Rest as much as possible until you feel better. · Be safe with medicines. Take your medicine exactly as prescribed. Call your doctor if you think you are having a problem with your medicine. You will get more details on the specific medicine your doctor prescribes. · Take an over-the-counter pain medicine, such as acetaminophen (Tylenol), ibuprofen (Advil, Motrin), or naproxen (Aleve), as needed for pain and fever. Read and follow all instructions on the label. Do not give aspirin to anyone younger than 20. It has been linked to Reye syndrome, a serious illness. · Drink plenty of fluids, enough so that your urine is light yellow or clear like water. Hot fluids, such as tea or soup, may help relieve congestion in your nose and throat. If you have kidney, heart, or liver disease and have to limit fluids, talk with your doctor before you increase the amount of fluids you drink. · Try to clear mucus from your lungs by breathing deeply and coughing. · Gargle with warm salt water once an hour.  This can help reduce swelling and throat pain. Use 1 teaspoon of salt mixed in 1 cup of warm water. · Do not smoke or allow others to smoke around you. If you need help quitting, talk to your doctor about stop-smoking programs and medicines. These can increase your chances of quitting for good. To avoid spreading the virus · Cough or sneeze into a tissue. Then throw the tissue away. · If you don't have a tissue, use your hand to cover your cough or sneeze. Then clean your hand. You can also cough into your sleeve. · Wash your hands often. Use soap and warm water. Wash for 15 to 20 seconds each time. · If you don't have soap and water near you, you can clean your hands with alcohol wipes or gel. When should you call for help? Call your doctor now or seek immediate medical care if: 
? · You have a new or higher fever. ? · Your fever lasts more than 48 hours. ? · You have trouble breathing. ? · You have a fever with a stiff neck or a severe headache. ? · You are sensitive to light. ? · You feel very sleepy or confused. ? Watch closely for changes in your health, and be sure to contact your doctor if: 
? · You do not get better as expected. Where can you learn more? Go to http://dainal-patricia.info/. Enter W053 in the search box to learn more about \"Viral Respiratory Infection: Care Instructions. \" Current as of: May 12, 2017 Content Version: 11.4 © 3942-8221 [employer] Care instructions adapted under license by [employer] (which disclaims liability or warranty for this information). If you have questions about a medical condition or this instruction, always ask your healthcare professional. [place] [address] any warranty or liability for your use of this information. Introducing [facility]! Dear Raffi Yang: Thank you for requesting a [employer] account. Our records indicate that you already have an active [employer] account.   You can access your account anytime at [email or web address] Did you know that you can access your hospital and ER discharge instructions at any time in [employer]? You can also review all of your test results from your hospital stay or ER visit. Additional Information If you have questions, please visit the Frequently Asked Questions section of the [employer] website at [email or web address] Remember, [employer] is NOT to be used for urgent needs. For medical emergencies, dial 911. Now available from your iPhone and Android! Please provide this summary of care documentation to your next provider. Your primary care clinician is listed as Harlan Estrada. If you have any questions after today's visit, please call [phone].

## 2018-03-20 NOTE — PATIENT INSTRUCTIONS
Viral Respiratory Infection: Care Instructions  Your Care Instructions    Viruses are very small organisms. They grow in number after they enter your body. There are many types that cause different illnesses, such as colds and the mumps. The symptoms of a viral respiratory infection often start quickly. They include a fever, sore throat, and runny nose. You may also just not feel well. Or you may not want to eat much. Most viral respiratory infections are not serious. They usually get better with time and self-care. Antibiotics are not used to treat a viral infection. That's because antibiotics will not help cure a viral illness. In some cases, antiviral medicine can help your body fight a serious viral infection. Follow-up care is a key part of your treatment and safety. Be sure to make and go to all appointments, and call your doctor if you are having problems. It's also a good idea to know your test results and keep a list of the medicines you take. How can you care for yourself at home? · Rest as much as possible until you feel better. · Be safe with medicines. Take your medicine exactly as prescribed. Call your doctor if you think you are having a problem with your medicine. You will get more details on the specific medicine your doctor prescribes. · Take an over-the-counter pain medicine, such as acetaminophen (Tylenol), ibuprofen (Advil, Motrin), or naproxen (Aleve), as needed for pain and fever. Read and follow all instructions on the label. Do not give aspirin to anyone younger than 20. It has been linked to Reye syndrome, a serious illness. · Drink plenty of fluids, enough so that your urine is light yellow or clear like water. Hot fluids, such as tea or soup, may help relieve congestion in your nose and throat. If you have kidney, heart, or liver disease and have to limit fluids, talk with your doctor before you increase the amount of fluids you drink.   · Try to clear mucus from your lungs by breathing deeply and coughing. · Gargle with warm salt water once an hour. This can help reduce swelling and throat pain. Use 1 teaspoon of salt mixed in 1 cup of warm water. · Do not smoke or allow others to smoke around you. If you need help quitting, talk to your doctor about stop-smoking programs and medicines. These can increase your chances of quitting for good. To avoid spreading the virus  · Cough or sneeze into a tissue. Then throw the tissue away. · If you don't have a tissue, use your hand to cover your cough or sneeze. Then clean your hand. You can also cough into your sleeve. · Wash your hands often. Use soap and warm water. Wash for 15 to 20 seconds each time. · If you don't have soap and water near you, you can clean your hands with alcohol wipes or gel. When should you call for help? Call your doctor now or seek immediate medical care if:  ? · You have a new or higher fever. ? · Your fever lasts more than 48 hours. ? · You have trouble breathing. ? · You have a fever with a stiff neck or a severe headache. ? · You are sensitive to light. ? · You feel very sleepy or confused. ? Watch closely for changes in your health, and be sure to contact your doctor if:  ? · You do not get better as expected. Where can you learn more? Go to http://danial-patricia.info/. Enter Y473 in the search box to learn more about \"Viral Respiratory Infection: Care Instructions. \"  Current as of: May 12, 2017  Content Version: 11.4  © 3975-8850 [employer]. Care instructions adapted under license by [employer] (which disclaims liability or warranty for this information). If you have questions about a medical condition or this instruction, always ask your healthcare professional. [address] any warranty or liability for your use of this information.

## 2018-03-20 NOTE — PROGRESS NOTES
Jose Luis Sandra is a 34 y.o.  female and presents with    Chief Complaint   Patient presents with    Chills    Generalized Body Aches    Fever    Nausea    Cold Symptoms       Subjective:  Ms. Jaden Palacios presents today with complaints of fever, chills, and generalized body aches. Symptoms have been present since yesterday and have gotten worse. She has taken OTC tylenol cold and flu. She has a cough but has not been coughing anything up. She reports facial tenderness and pressure. She also has a sore throat. She has a 10year old daughter that has been having similar symptoms. She denies contact with anyone who has had strep or flu. Temperature at home was 100.5    She was recently treated at [facility] for a dental infection. She was given Amoxicillin and completed antibiotics yesterday. Additional Concerns: No       There is no problem list on file for this patient. Current Outpatient Prescriptions   Medication Sig Dispense Refill    norethindrone-ethinyl estradiol (JUNEL FE 1/20) 1 mg-20 mcg (21)/75 mg (7) tab Take 1 Tab by mouth daily. 3 Package 4    amoxicillin (AMOXIL) 500 mg capsule       phenazopyridine (PYRIDIUM) 100 mg tablet Take  by mouth three (3) times daily (after meals).  lansoprazole (PREVACID) 15 mg capsule Take  by mouth Daily (before breakfast).  omeprazole (PRILOSEC) 20 mg capsule Take 1 Cap by mouth daily. 30 Cap 0    ondansetron hcl (ZOFRAN) 4 mg tablet Take 1 Tab by mouth every eight (8) hours as needed for Nausea. 12 Tab 0    loratadine (CLARITIN) 10 mg tablet Take 1 Tab by mouth daily as needed for Allergies.  30 Tab 0     No Known Allergies  Past Medical History:   Diagnosis Date    Abnormal Papanicolaou smear of cervix 2007     Past Surgical History:   Procedure Laterality Date    HX GYN      cryotherapy to cervix     NE COLPOSC,CERVIX W/ADJ VAG,W/BX & CURRETAG  7/26/2017     Family History   Problem Relation Age of Onset    Cancer Mother 52     Breast Cancer/Cervical Cancer    COPD Mother     No Known Problems Father     Cancer Maternal Aunt 44     Metastatic Cancer, unclear primary     Social History   Substance Use Topics    Smoking status: Former Smoker    Smokeless tobacco: Never Used    Alcohol use Yes      Comment: ocassionally       ROS   History obtained from the patient  General ROS: positive for  - chills and fever  ENT ROS: positive for - nasal congestion, sinus pain and sore throat  Respiratory ROS: positive for - cough  Cardiovascular ROS: positive for - chest discomfort with coughing    All other systems reviewed and are negative. Objective:  Vitals:    03/20/18 1130 03/20/18 1146   BP: (!) 87/57 107/65   Pulse: (!) 125 (!) 124   Resp: 19    Temp: 98.5 °F (36.9 °C)    TempSrc: Oral    SpO2: 99%    Weight: 109 lb 12.8 oz (49.8 kg)    Height: 5' 2\" (1.575 m)    PainSc:   9    PainLoc: Generalized    LMP: 03/01/2018       PE  General appearance - alert, well appearing, and in no distress  Mental status - normal mood, behavior, speech, dress, motor activity, and thought processes  Ears - bilateral TM's and external ear canals normal  Nose - normal and patent, no erythema, discharge or polyps and sinus tenderness noted frontal  Mouth - mucous membranes moist, pharynx normal without lesions  Neck - bilateral anterior adenopathy  Chest - clear to auscultation, no wheezes, rales or rhonchi, symmetric air entry  Heart - normal rate and regular rhythm      LABS   3/20/2018 11:40 AM - Rosalee Jarvis LPN   Component Results   Component Value Flag Ref Range Units Status   VALID INTERNAL CONTROL POC Yes    Final   QuickVue Influenza test Negative  Negative  Final     3/20/2018 11:42 AM - Rosalee Jarvis LPN   Component Results   Component Value Flag Ref Range Units Status   VALID INTERNAL CONTROL POC Yes    Final   Group A Strep Ag Negative  Negative  Final       Assessment/Plan:    1.  Viral Upper Respiratory Illness- rapid flu and strep negative; symptomatic care; increase fluid intake; rest    2. Fever/Body Aches- rapid flu negative; symptomatic care; ibuprofen or acetaminophen for fever and aches    3. Sore Throat- rapid strep negative; symptomatic care; throat lozenges    Lab review: no lab studies available for review at time of visit    Today's Visit: POC Rapid Flu, POC Rapid Strep    Health Maintenance:   Up to date    I have discussed the diagnosis with the patient and the intended plan as seen in the above orders. The patient has received an after-visit summary and questions were answered concerning future plans. I have discussed medication side effects and warnings with the patient as well. I have reviewed the plan of care with the patient, accepted their input and they are in agreement with the treatment goals. Follow-up Disposition:  Return if symptoms worsen or fail to improve. More than 1/2 of this 15 minute visit was spent in counseling and coordination of care, as described above.     RAMIN Morelos

## 2018-03-22 ENCOUNTER — TELEPHONE (OUTPATIENT)
Dept: FAMILY MEDICINE CLINIC | Age: 33
End: 2018-03-22

## 2018-03-22 NOTE — TELEPHONE ENCOUNTER
Received call from Ms Hilda Green with c/o cough and SOB with cough. Patient LOV was 3/20/2018 she stated that she has no c/o nasal congestion anymore. Patient stated that she is not currently on any medication but would like to know what she should do.  Please advise

## 2018-03-23 ENCOUNTER — OFFICE VISIT (OUTPATIENT)
Dept: FAMILY MEDICINE CLINIC | Age: 33
End: 2018-03-23

## 2018-03-23 VITALS
DIASTOLIC BLOOD PRESSURE: 67 MMHG | WEIGHT: 109.8 LBS | OXYGEN SATURATION: 95 % | RESPIRATION RATE: 19 BRPM | TEMPERATURE: 96.8 F | HEART RATE: 98 BPM | SYSTOLIC BLOOD PRESSURE: 103 MMHG | HEIGHT: 62 IN | BODY MASS INDEX: 20.2 KG/M2

## 2018-03-23 DIAGNOSIS — R05.9 COUGH: Primary | ICD-10-CM

## 2018-03-23 DIAGNOSIS — R06.02 SHORTNESS OF BREATH: ICD-10-CM

## 2018-03-23 DIAGNOSIS — J40 BRONCHITIS: ICD-10-CM

## 2018-03-23 RX ORDER — BENZONATATE 100 MG/1
100 CAPSULE ORAL
Qty: 21 CAP | Refills: 0 | Status: SHIPPED | OUTPATIENT
Start: 2018-03-23 | End: 2018-03-30

## 2018-03-23 RX ORDER — ALBUTEROL SULFATE 90 UG/1
1 AEROSOL, METERED RESPIRATORY (INHALATION)
Qty: 1 INHALER | Refills: 0 | Status: SHIPPED | OUTPATIENT
Start: 2018-03-23

## 2018-03-23 NOTE — MR AVS SNAPSHOT
[facility] 
 
 
 [address] [facility] [place] [address] [facility] [address] [id number] 
[phone] Patient: Donald Gallagher MRN: [id number] :1985 Visit Information Date & Time Provider Department Dept. Phone Encounter #  
 3/23/2018 10:30 AM Bucky Hsu NP [address] [address] [phone] Follow-up Instructions Return if symptoms worsen or fail to improve. Your Appointments 4/10/2018 10:00 AM  
[place] [address] [address]) Appt Note: Return in about 6 months (around 4/10/2018), or if symptoms worsen or fail to improve, for Hep A #2.-NV per [facility] [address] [facility] [place] [address] [facility] 83 222 [facility]  
  
   
 [address] [facility] [place] [facility]  
  
    
 10/3/2018  9:00 AM  
Complete Physical with Bucky Hsu NP [address] [address]) Appt Note: Return in about 1 year (around 10/3/2018) for annual physical.  
 [address] [place] [address] [facility] 83 222 [facility]  
  
   
 [address] [place] [address] Upcoming Health Maintenance Date Due  
 PAP AKA CERVICAL CYTOLOGY 7/3/2020 DTaP/Tdap/Td series (2 - Td) 10/10/2027 Allergies as of 3/23/2018  Review Complete On: 3/23/2018 By: Bucky Hsu NP No Known Allergies Current Immunizations  Reviewed on 10/10/2017 Name Date Hep A Vaccine (Adult) 10/10/2017 Influenza Vaccine (Quad) PF 10/3/2017 Tdap 10/10/2017 Not reviewed this visit You Were Diagnosed With   
  
 Codes Comments Cough    -  Primary ICD-10-CM: Z08 ICD-9-CM: 786.2 Shortness of breath     ICD-10-CM: R06.02 
ICD-9-CM: 786.05 Bronchitis     ICD-10-CM: J40 ICD-9-CM: 382 Vitals BP Pulse Temp Resp Height(growth percentile) Weight(growth percentile)  103/67 (BP 1 Location: Left arm, BP Patient Position: Sitting) 98 96.8 °F (36 °C) (Oral) 19 5' 2\" (1.575 m) 109 lb 12.8 oz (49.8 kg) LMP SpO2 BMI OB Status Smoking Status 03/01/2018 (Approximate) 95% 20.08 kg/m2 Having regular periods Former Smoker BMI and BSA Data Body Mass Index Body Surface Area 20.08 kg/m 2 1.48 m 2 Preferred Pharmacy Pharmacy Name Phone [facility], [address] [phone] Your Updated Medication List  
  
   
This list is accurate as of 3/23/18 10:55 AM.  Always use your most recent med list.  
  
  
  
  
 albuterol 90 mcg/actuation inhaler Commonly known as:  PROVENTIL HFA, VENTOLIN HFA, PROAIR HFA Take 1 Puff by inhalation every four (4) hours as needed. amoxicillin 500 mg capsule Commonly known as:  AMOXIL  
  
 benzonatate 100 mg capsule Commonly known as:  TESSALON Take 1 Cap by mouth three (3) times daily as needed for Cough for up to 7 days. lansoprazole 15 mg capsule Commonly known as:  PREVACID Take  by mouth Daily (before breakfast). loratadine 10 mg tablet Commonly known as:  Barb Martina Take 1 Tab by mouth daily as needed for Allergies. norethindrone-ethinyl estradiol 1 mg-20 mcg (21)/75 mg (7) Tab Commonly known as:  Arpan Graciela FE 1/20 Take 1 Tab by mouth daily. omeprazole 20 mg capsule Commonly known as:  PRILOSEC Take 1 Cap by mouth daily. ondansetron hcl 4 mg tablet Commonly known as:  Henreitta Vish Take 1 Tab by mouth every eight (8) hours as needed for Nausea. phenazopyridine 100 mg tablet Commonly known as:  PYRIDIUM Take  by mouth three (3) times daily (after meals). Prescriptions Sent to Pharmacy Refills  
 benzonatate (TESSALON) 100 mg capsule 0 Sig: Take 1 Cap by mouth three (3) times daily as needed for Cough for up to 7 days.   
 Class: Normal  
 Pharmacy: [facility] [address] [place] [address], [address] [facility] [address] [place] [address] Ph #: [phone] Route: Oral  
 albuterol (PROVENTIL HFA, VENTOLIN HFA, PROAIR HFA) 90 mcg/actuation inhaler 0 Sig: Take 1 Puff by inhalation every four (4) hours as needed. Class: Normal  
 Pharmacy: [facility] [address] [place] [address], [address] Ph #: [phone] Route: Inhalation Follow-up Instructions Return if symptoms worsen or fail to improve. Patient Instructions Learning About Chronic Bronchitis What is chronic bronchitis? Chronic bronchitis is long-term swelling and the buildup of mucus in the airways of your lungs. The airways (bronchial tubes) get inflamed and make a lot of mucus. This can narrow or block the airways, making it hard for you to breathe. It is a form of COPD (chronic obstructive pulmonary disease). Chronic bronchitis is usually caused by smoking. But chemical fumes, dust, or air pollution also can cause it over time. What can you expect when you have chronic bronchitis? Chronic bronchitis gets worse over time. You cannot undo the damage to your lungs. Over time, you may find that: 
· You get short of breath even when you do simple things like get dressed or fix a meal. 
· It is hard to eat or exercise. · You lose weight and feel weaker. Over many years, the swelling and mucus from chronic bronchitis make it more likely that you will get lung infections. But there are things you can do to prevent more damage and feel better. What are the symptoms? The main symptoms of chronic bronchitis are: · A cough that will not go away. · Mucus that comes up when you cough. · Shortness of breath that gets worse when you exercise. At times, your symptoms may suddenly flare up and get much worse. This is a called an exacerbation (say \"egg-ZACOLE-er-BAY-un\"). When this happens, your usual symptoms quickly get worse and stay bad. This can be dangerous. You may have to go to the hospital. 
How can you keep chronic bronchitis from getting worse? Don't smoke. That is the best way to keep chronic bronchitis from getting worse. If you already smoke, it is never too late to stop. If you need help quitting, talk to your doctor about stop-smoking programs and medicines. These can increase your chances of quitting for good. You can do other things to keep chronic bronchitis from getting worse: · Avoid bad air. Air pollution, chemical fumes, and dust also can make chronic bronchitis worse. · Get a flu shot every year. A shot may keep the flu from turning into something more serious, like pneumonia. A flu shot also may lower your chances of having a flare-up. · Get a pneumococcal shot. A shot can prevent some of the serious complications of pneumonia. Ask your doctor how often you should get this shot. How is chronic bronchitis treated? Chronic bronchitis is treated with medicines and oxygen. You also can take steps at home to stay healthy and keep your condition from getting worse. Medicines and oxygen therapy · You may be taking medicines such as: ¨ Bronchodilators. These help open your airways and make breathing easier. Bronchodilators are either short-acting (work for 6 to 9 hours) or long-acting (work for 24 hours). You inhale most bronchodilators, so they start to act quickly. Always carry your quick-relief inhaler with you in case you need it while you are away from home. ¨ Corticosteroids. These reduce airway inflammation. They come in pill or inhaled form. You must take these medicines every day for them to work well. ¨ Antibiotics. These medicines are used when you have a bacterial lung infection. · Take your medicines exactly as prescribed. Call your doctor if you think you are having a problem with your medicine.  
· Oxygen therapy boosts the amount of oxygen in your blood and helps you breathe easier. Use the flow rate your doctor has recommended, and do not change it without talking to your doctor first. 
Other care at home · If your doctor recommends it, get more exercise. Walking is a good choice. Bit by bit, increase the amount you walk every day. Try for at least 30 minutes on most days of the week. · Learn breathing methods-such as breathing through pursed lips-to help you become less short of breath. · If your doctor has not set you up with a pulmonary rehabilitation program, talk to him or her about whether rehab is right for you. Rehab includes exercise programs, education about your disease and how to manage it, help with diet and other changes, and emotional support. · Eat regular, healthy meals. Use bronchodilators about 1 hour before you eat to make it easier to eat. Eat several small meals instead of three large ones. Drink beverages at the end of the meal. Avoid foods that are hard to chew. Follow-up care is a key part of your treatment and safety. Be sure to make and go to all appointments, and call your doctor if you are having problems. It's also a good idea to know your test results and keep a list of the medicines you take. Where can you learn more? Go to http://danial-patriica.info/. Enter L595 in the search box to learn more about \"Learning About Chronic Bronchitis. \" Current as of: May 12, 2017 Content Version: 11.4 © 9971-1420 Healthwise, Incorporated. Care instructions adapted under license by [employer] (which disclaims liability or warranty for this information). If you have questions about a medical condition or this instruction, always ask your healthcare professional. [address] any warranty or liability for your use of this information. Introducing [facility]! Dear Idris: Thank you for requesting a irisnote account.   Our records indicate that you already have an active Memetales account. You can access your account anytime at [email or web address] Did you know that you can access your hospital and ER discharge instructions at any time in Memetales? You can also review all of your test results from your hospital stay or ER visit. Additional Information If you have questions, please visit the Frequently Asked Questions section of the Memetales website at [email or web address] Remember, Memetales is NOT to be used for urgent needs. For medical emergencies, dial 911. Now available from your iPhone and Android! Please provide this summary of care documentation to your next provider. Your primary care clinician is listed as Alcides Vargas. If you have any questions after today's visit, please call [phone].

## 2018-03-23 NOTE — LETTER
NOTIFICATION RETURN TO WORK 
 
3/23/2018 10:54 AM 
 
Ms. Ernestina Valentine [address] [place] [phone] To Whom It May Concern: 
 
Ernestina Valentine is currently under the care of [address] [place] [address]. She will return to work on: Monday, March 26, 2018 She is able to return to full duty without any restrictions. If there are questions or concerns please have the patient contact our office.  
 
 
 
Sincerely, 
 
 
Meaghan Carter NP

## 2018-03-23 NOTE — PROGRESS NOTES
Ernestina Valentine is a 34 y.o.  female and presents with    Chief Complaint   Patient presents with    Shortness of Breath    Cough       Subjective:  Ms. Karime Mendoza presents today with complaints of cough and shortness of breath x 2 days. She was seen here on 3/20/18 with complaints of cold symptoms. Rapid flu was negative. She states her cold symptoms have since resolved but now she has shortness of breath of breath at rest and a non productive cough. She has not taken any OTC medications for her symptoms. She is not a current smoker and does not have history of asthma. She states she was once told in the past she may have chronic bronchitis but she had never been placed on maintenance inhalers. Additional Concerns: No         There is no problem list on file for this patient. Current Outpatient Prescriptions   Medication Sig Dispense Refill    norethindrone-ethinyl estradiol (JUNEL FE 1/20) 1 mg-20 mcg (21)/75 mg (7) tab Take 1 Tab by mouth daily. 3 Package 4    amoxicillin (AMOXIL) 500 mg capsule       phenazopyridine (PYRIDIUM) 100 mg tablet Take  by mouth three (3) times daily (after meals).  lansoprazole (PREVACID) 15 mg capsule Take  by mouth Daily (before breakfast).  omeprazole (PRILOSEC) 20 mg capsule Take 1 Cap by mouth daily. 30 Cap 0    ondansetron hcl (ZOFRAN) 4 mg tablet Take 1 Tab by mouth every eight (8) hours as needed for Nausea. 12 Tab 0    loratadine (CLARITIN) 10 mg tablet Take 1 Tab by mouth daily as needed for Allergies.  30 Tab 0     No Known Allergies  Past Medical History:   Diagnosis Date    Abnormal Papanicolaou smear of cervix 2007     Past Surgical History:   Procedure Laterality Date    HX GYN      cryotherapy to cervix     WA COLPOSC,CERVIX W/ADJ VAG,W/BX & CURRETAG  7/26/2017     Family History   Problem Relation Age of Onset    Cancer Mother 52     Breast Cancer/Cervical Cancer    COPD Mother     No Known Problems Father     Cancer Maternal Aunt 40     Metastatic Cancer, unclear primary     Social History   Substance Use Topics    Smoking status: Former Smoker    Smokeless tobacco: Never Used    Alcohol use Yes      Comment: ocassionally       ROS   History obtained from the patient  General ROS: negative for - chills or fever  Respiratory ROS: positive for - cough and shortness of breath  Cardiovascular ROS: positive for - palpitations    All other systems reviewed and are negative. Objective:  Vitals:    03/23/18 1031   BP: 103/67   Pulse: 98   Resp: 19   Temp: 96.8 °F (36 °C)   TempSrc: Oral   SpO2: 95%   Weight: 109 lb 12.8 oz (49.8 kg)   Height: 5' 2\" (1.575 m)   PainSc:   0 - No pain   LMP: 03/01/2018       PE  General appearance - alert, well appearing, and in no distress  Mental status - normal mood, behavior, speech, dress, motor activity, and thought processes  Chest - clear to auscultation, no wheezes, rales or rhonchi, symmetric air entry  Heart - normal rate and regular rhythm  Extremities - peripheral pulses normal, no pedal edema, no clubbing or cyanosis        Assessment/Plan:    1. Bronchitis/Cough- tessalon PRN; discussed etiology of bronchitis and duration of symptoms;     2. Shortness of Breath- albuterol inhaler PRN for SOB; call office in 3 days if symptoms have not improved    Lab review: no lab studies available for review at time of visit      Today's Visit: Tessalon, Albuterol    Health Maintenance:     I have discussed the diagnosis with the patient and the intended plan as seen in the above orders. The patient has received an after-visit summary and questions were answered concerning future plans. I have discussed medication side effects and warnings with the patient as well. I have reviewed the plan of care with the patient, accepted their input and they are in agreement with the treatment goals. Follow-up Disposition:  Return if symptoms worsen or fail to improve.   More than 1/2 of this 15 minute visit was spent in counseling and coordination of care, as described above.     RAMIN Scott

## 2018-03-23 NOTE — PATIENT INSTRUCTIONS
Learning About Chronic Bronchitis  What is chronic bronchitis? Chronic bronchitis is long-term swelling and the buildup of mucus in the airways of your lungs. The airways (bronchial tubes) get inflamed and make a lot of mucus. This can narrow or block the airways, making it hard for you to breathe. It is a form of COPD (chronic obstructive pulmonary disease). Chronic bronchitis is usually caused by smoking. But chemical fumes, dust, or air pollution also can cause it over time. What can you expect when you have chronic bronchitis? Chronic bronchitis gets worse over time. You cannot undo the damage to your lungs. Over time, you may find that:  · You get short of breath even when you do simple things like get dressed or fix a meal.  · It is hard to eat or exercise. · You lose weight and feel weaker. Over many years, the swelling and mucus from chronic bronchitis make it more likely that you will get lung infections. But there are things you can do to prevent more damage and feel better. What are the symptoms? The main symptoms of chronic bronchitis are:  · A cough that will not go away. · Mucus that comes up when you cough. · Shortness of breath that gets worse when you exercise. At times, your symptoms may suddenly flare up and get much worse. This is a called an exacerbation (say \"egg-ZACOLE-er-BAY-maría\"). When this happens, your usual symptoms quickly get worse and stay bad. This can be dangerous. You may have to go to the hospital.  How can you keep chronic bronchitis from getting worse? Don't smoke. That is the best way to keep chronic bronchitis from getting worse. If you already smoke, it is never too late to stop. If you need help quitting, talk to your doctor about stop-smoking programs and medicines. These can increase your chances of quitting for good. You can do other things to keep chronic bronchitis from getting worse:  · Avoid bad air.  Air pollution, chemical fumes, and dust also can make chronic bronchitis worse. · Get a flu shot every year. A shot may keep the flu from turning into something more serious, like pneumonia. A flu shot also may lower your chances of having a flare-up. · Get a pneumococcal shot. A shot can prevent some of the serious complications of pneumonia. Ask your doctor how often you should get this shot. How is chronic bronchitis treated? Chronic bronchitis is treated with medicines and oxygen. You also can take steps at home to stay healthy and keep your condition from getting worse. Medicines and oxygen therapy  · You may be taking medicines such as:  ¨ Bronchodilators. These help open your airways and make breathing easier. Bronchodilators are either short-acting (work for 6 to 9 hours) or long-acting (work for 24 hours). You inhale most bronchodilators, so they start to act quickly. Always carry your quick-relief inhaler with you in case you need it while you are away from home. ¨ Corticosteroids. These reduce airway inflammation. They come in pill or inhaled form. You must take these medicines every day for them to work well. ¨ Antibiotics. These medicines are used when you have a bacterial lung infection. · Take your medicines exactly as prescribed. Call your doctor if you think you are having a problem with your medicine. · Oxygen therapy boosts the amount of oxygen in your blood and helps you breathe easier. Use the flow rate your doctor has recommended, and do not change it without talking to your doctor first.  Other care at home  · If your doctor recommends it, get more exercise. Walking is a good choice. Bit by bit, increase the amount you walk every day. Try for at least 30 minutes on most days of the week. · Learn breathing methods-such as breathing through pursed lips-to help you become less short of breath. · If your doctor has not set you up with a pulmonary rehabilitation program, talk to him or her about whether rehab is right for you.  Rehab includes exercise programs, education about your disease and how to manage it, help with diet and other changes, and emotional support. · Eat regular, healthy meals. Use bronchodilators about 1 hour before you eat to make it easier to eat. Eat several small meals instead of three large ones. Drink beverages at the end of the meal. Avoid foods that are hard to chew. Follow-up care is a key part of your treatment and safety. Be sure to make and go to all appointments, and call your doctor if you are having problems. It's also a good idea to know your test results and keep a list of the medicines you take. Where can you learn more? Go to http://danial-patricia.info/. Enter D793 in the search box to learn more about \"Learning About Chronic Bronchitis. \"  Current as of: May 12, 2017  Content Version: 11.4  © 9886-7466 Healthwise, Incorporated. Care instructions adapted under license by [employer] (which disclaims liability or warranty for this information). If you have questions about a medical condition or this instruction, always ask your healthcare professional. [address] any warranty or liability for your use of this information.

## 2018-03-23 NOTE — PROGRESS NOTES
Chief Complaint:    SOB   Cough    SUBJECTIVE:    David Bal is a 34 y.o. female who presents today for SOB and cough    1. Have you been to the ER, urgent care clinic since your last visit? Hospitalized since your last visit? NO    2. Have you seen or consulted any other health care providers outside of the [address] [place] [address] since your last visit? Include any pap smears or colon screening. NO    Health Maintenance reviewed  Up to date    There are no preventive care reminders to display for this patient.

## 2018-04-23 ENCOUNTER — OFFICE VISIT (OUTPATIENT)
Dept: FAMILY MEDICINE CLINIC | Age: 33
End: 2018-04-23

## 2018-04-23 VITALS
WEIGHT: 110 LBS | HEIGHT: 62 IN | SYSTOLIC BLOOD PRESSURE: 96 MMHG | OXYGEN SATURATION: 97 % | BODY MASS INDEX: 20.24 KG/M2 | DIASTOLIC BLOOD PRESSURE: 69 MMHG | HEART RATE: 95 BPM | TEMPERATURE: 95.7 F | RESPIRATION RATE: 16 BRPM

## 2018-04-23 DIAGNOSIS — Z20.818 EXPOSURE TO STREPTOCOCCAL PHARYNGITIS: ICD-10-CM

## 2018-04-23 DIAGNOSIS — J02.9 SORE THROAT: Primary | ICD-10-CM

## 2018-04-23 LAB
S PYO AG THROAT QL: NEGATIVE
VALID INTERNAL CONTROL?: YES

## 2018-04-23 RX ORDER — AMOXICILLIN AND CLAVULANATE POTASSIUM 875; 125 MG/1; MG/1
1 TABLET, FILM COATED ORAL 2 TIMES DAILY
Qty: 20 TAB | Refills: 0 | Status: SHIPPED | OUTPATIENT
Start: 2018-04-23 | End: 2018-05-03

## 2018-04-23 RX ORDER — FLUCONAZOLE 150 MG/1
150 TABLET ORAL DAILY
Qty: 1 TAB | Refills: 0 | Status: SHIPPED | OUTPATIENT
Start: 2018-04-23 | End: 2018-04-24

## 2018-04-23 NOTE — MR AVS SNAPSHOT
[address] [facility] 
 
 
 [address]  [place] [address] [address] 
[phone] Patient: Gaetano Saldana MRN: [id number] :1985 Visit Information Date & Time Provider Department Dept. Phone Encounter #  
 2018 11:30 AM Karlee Spencer 7[address] [facility] [phone] Your Appointments 10/3/2018  9:00 AM  
Complete Physical with Karlee Spencer NP [address] [address]) Appt Note: Return in about 1 year (around 10/3/2018) for annual physical.  
 [address] [place] [address] [phone] [facility]  
  
   
 [address] [facility] [address] Upcoming Health Maintenance Date Due  
 PAP AKA CERVICAL CYTOLOGY 7/3/2020 DTaP/Tdap/Td series (2 - Td) 10/10/2027 Allergies as of 2018  Review Complete On: 2018 By: Karlee Spencer NP No Known Allergies Current Immunizations  Reviewed on 10/10/2017 Name Date Hep A Vaccine (Adult) 10/10/2017 Influenza Vaccine (Quad) PF 10/3/2017 Tdap 10/10/2017 Not reviewed this visit You Were Diagnosed With   
  
 Codes Comments Sore throat    -  Primary ICD-10-CM: J02.9 ICD-9-CM: 008 Exposure to Streptococcal pharyngitis     ICD-10-CM: Z20.818 ICD-9-CM: V01.89 Vitals BP Pulse Temp Resp Height(growth percentile) Weight(growth percentile) 96/69 (BP 1 Location: Left arm, BP Patient Position: Sitting) 95 95.7 °F (35.4 °C) 16 5' 2\" (1.575 m) 110 lb (49.9 kg) LMP SpO2 BMI OB Status Smoking Status 2018 97% 20.12 kg/m2 Having regular periods Former Smoker Vitals History BMI and BSA Data Body Mass Index Body Surface Area  
 20.12 kg/m 2 1.48 m 2 Preferred Pharmacy Pharmacy Name Phone [place], [address] [facility] 11 [facility] [phone] Your Updated Medication List  
  
   
 This list is accurate as of 4/23/18 11:46 AM.  Always use your most recent med list.  
  
  
  
  
 albuterol 90 mcg/actuation inhaler Commonly known as:  PROVENTIL HFA, VENTOLIN HFA, PROAIR HFA Take 1 Puff by inhalation every four (4) hours as needed. amoxicillin-clavulanate 875-125 mg per tablet Commonly known as:  AUGMENTIN Take 1 Tab by mouth two (2) times a day for 10 days. fluconazole 150 mg tablet Commonly known as:  DIFLUCAN Take 1 Tab by mouth daily for 1 day. FDA advises cautious prescribing of oral fluconazole in pregnancy. lansoprazole 15 mg capsule Commonly known as:  PREVACID Take  by mouth Daily (before breakfast). loratadine 10 mg tablet Commonly known as:  Gene Sachin Take 1 Tab by mouth daily as needed for Allergies. norethindrone-ethinyl estradiol 1 mg-20 mcg (21)/75 mg (7) Tab Commonly known as:  Verla Reek FE 1/20 Take 1 Tab by mouth daily. omeprazole 20 mg capsule Commonly known as:  PRILOSEC Take 1 Cap by mouth daily. ondansetron hcl 4 mg tablet Commonly known as:  Fredrich Cone Take 1 Tab by mouth every eight (8) hours as needed for Nausea. Prescriptions Sent to Pharmacy Refills  
 amoxicillin-clavulanate (AUGMENTIN) 875-125 mg per tablet 0 Sig: Take 1 Tab by mouth two (2) times a day for 10 days. Class: Normal  
 Pharmacy: [facility] [address] [place] [address] Ph #: [phone] Route: Oral  
 fluconazole (DIFLUCAN) 150 mg tablet 0 Sig: Take 1 Tab by mouth daily for 1 day. FDA advises cautious prescribing of oral fluconazole in pregnancy. Class: Normal  
 Pharmacy: [facility] [address] [place] [address] Ph #: [phone] Route: Oral  
  
We Performed the Following AMB POC RAPID STREP A [64838 CPT(R)] Patient Instructions Sore Throat: Care Instructions Your Care Instructions Infection by bacteria or a virus causes most sore throats. Cigarette smoke, dry air, air pollution, allergies, and yelling can also cause a sore throat. Sore throats can be painful and annoying. Fortunately, most sore throats go away on their own. If you have a bacterial infection, your doctor may prescribe antibiotics. Follow-up care is a key part of your treatment and safety. Be sure to make and go to all appointments, and call your doctor if you are having problems. It's also a good idea to know your test results and keep a list of the medicines you take. How can you care for yourself at home? · If your doctor prescribed antibiotics, take them as directed. Do not stop taking them just because you feel better. You need to take the full course of antibiotics. · Gargle with warm salt water once an hour to help reduce swelling and relieve discomfort. Use 1 teaspoon of salt mixed in 1 cup of warm water. · Take an over-the-counter pain medicine, such as acetaminophen (Tylenol), ibuprofen (Advil, Motrin), or naproxen (Aleve). Read and follow all instructions on the label. · Be careful when taking over-the-counter cold or flu medicines and Tylenol at the same time. Many of these medicines have acetaminophen, which is Tylenol. Read the labels to make sure that you are not taking more than the recommended dose. Too much acetaminophen (Tylenol) can be harmful. · Drink plenty of fluids. Fluids may help soothe an irritated throat. Hot fluids, such as tea or soup, may help decrease throat pain. · Use over-the-counter throat lozenges to soothe pain. Regular cough drops or hard candy may also help. These should not be given to young children because of the risk of choking. · Do not smoke or allow others to smoke around you. If you need help quitting, talk to your doctor about stop-smoking programs and medicines. These can increase your chances of quitting for good. · Use a vaporizer or humidifier to add moisture to your bedroom. Follow the directions for cleaning the machine. When should you call for help? Call your doctor now or seek immediate medical care if: 
? · You have new or worse trouble swallowing. ? · Your sore throat gets much worse on one side. ? Watch closely for changes in your health, and be sure to contact your doctor if you do not get better as expected. Where can you learn more? Go to http://danial-patricia.info/. Enter [phone] [id number] [phone] in the search box to learn more about \"Sore Throat: Care Instructions. \" Current as of: May 12, 2017 Content Version: 11.4 © 3169-0182 e-channel. Care instructions adapted under license by [employer] (which disclaims liability or warranty for this information). If you have questions about a medical condition or this instruction, always ask your healthcare professional. [facility] any warranty or liability for your use of this information. Introducing [facility] HE[facility]! Dear Rachel Carey: Thank you for requesting a Zenput account. Our records indicate that you already have an active Zenput account. You can access your account anytime at [email or web address] Did you know that you can access your hospital and ER discharge instructions at any time in Zenput? You can also review all of your test results from your hospital stay or ER visit. Additional Information If you have questions, please visit the Frequently Asked Questions section of the Zenput website at [email or web address] Remember, Zenput is NOT to be used for urgent needs. For medical emergencies, dial 911. Now available from your iPhone and Android! Please provide this summary of care documentation to your next provider. Your primary care clinician is listed as Ahsan Ko.  If you have any questions after today's visit, please call [phone].

## 2018-04-23 NOTE — PROGRESS NOTES
Ly Morales is a 34 y.o. female  Chief Complaint   Patient presents with    Sore Throat     1. Have you been to the ER, urgent care clinic since your last visit? Hospitalized since your last visit? No    2. Have you seen or consulted any other health care providers outside of the [address] [place] [address] since your last visit? Include any pap smears or colon screening.  No

## 2018-04-23 NOTE — LETTER
NOTIFICATION RETURN TO WORK 
 
4/23/2018 11:46 AM 
 
Ms. Gaetano Saldana [address] [facility] [phone] To Whom It May Concern: 
 
Gaetano Saldana is currently under the care of [address] [place] [address]. She will return to work on: 4/24/18 If there are questions or concerns please have the patient contact our office.  
 
 
 
Sincerely, 
 
 
Karlee Spencer NP

## 2018-04-23 NOTE — PROGRESS NOTES
Shalom Gupta is a 34 y.o.  female and presents with    Chief Complaint   Patient presents with    Sore Throat       Subjective:  Sore Throat  Patient complains of sore throat for the past 1 day. Associated symptoms include sinus and nasal congestion, sore throat, nasal blockage and post nasal drip. Onset of symptoms was 1 day ago, gradually worsening since that time. She is drinking moderate amounts of fluids. She reports pain with eating. She has had recent close exposure to someone with proven streptococcal pharyngitis- all 3 of her kids had strep. Last week her kids completed their antibiotics for strep. Additional Concerns: No        There is no problem list on file for this patient. Current Outpatient Prescriptions   Medication Sig Dispense Refill    albuterol (PROVENTIL HFA, VENTOLIN HFA, PROAIR HFA) 90 mcg/actuation inhaler Take 1 Puff by inhalation every four (4) hours as needed. 1 Inhaler 0    lansoprazole (PREVACID) 15 mg capsule Take  by mouth Daily (before breakfast).  omeprazole (PRILOSEC) 20 mg capsule Take 1 Cap by mouth daily. 30 Cap 0    ondansetron hcl (ZOFRAN) 4 mg tablet Take 1 Tab by mouth every eight (8) hours as needed for Nausea. 12 Tab 0    norethindrone-ethinyl estradiol (JUNEL FE 1/20) 1 mg-20 mcg (21)/75 mg (7) tab Take 1 Tab by mouth daily. 3 Package 4    loratadine (CLARITIN) 10 mg tablet Take 1 Tab by mouth daily as needed for Allergies.  30 Tab 0     No Known Allergies  Past Medical History:   Diagnosis Date    Abnormal Papanicolaou smear of cervix 2007     Past Surgical History:   Procedure Laterality Date    HX GYN      cryotherapy to cervix     OK COLPOSC,CERVIX W/ADJ VAG,W/BX & CURRETAG  7/26/2017     Family History   Problem Relation Age of Onset    Cancer Mother 52     Breast Cancer/Cervical Cancer    COPD Mother     No Known Problems Father     Cancer Maternal Aunt 36     Metastatic Cancer, unclear primary     Social History   Substance Use Topics    Smoking status: Former Smoker    Smokeless tobacco: Never Used    Alcohol use Yes      Comment: ocassionally       ROS   History obtained from the patient  General ROS: negative for - chills or fever  ENT ROS: positive for - headaches and sore throat  Respiratory ROS: positive for - cough  Cardiovascular ROS: no chest pain or dyspnea on exertion    All other systems reviewed and are negative. Objective:  Vitals:    04/23/18 1126   BP: 96/69   Pulse: 95   Resp: 16   Temp: 95.7 °F (35.4 °C)   SpO2: 97%   Weight: 110 lb (49.9 kg)   Height: 5' 2\" (1.575 m)   PainSc:   3   PainLoc: Throat   LMP: 04/17/2018       PE  General appearance - alert, well appearing, and in no distress  Mental status - normal mood, behavior, speech, dress, motor activity, and thought processes  Mouth - mucous membranes moist, pharynx normal without lesions  Neck - bilateral symmetric anterior adenopathy  Chest - clear to auscultation, no wheezes, rales or rhonchi, symmetric air entry  Heart - normal rate and regular rhythm      LABS   4/23/2018 11:46 AM - Shaka Vegas LPN   Component Results   Component Value Flag Ref Range Units Status   VALID INTERNAL CONTROL POC Yes    Final   Group A Strep Ag Negative  Negative  Final       Assessment/Plan:    1. Sore Throat/Exposure to Strep-rapid strep negative; due to close contacts with strep will go ahead and treat with Augmentin BID; fluconazole if symptomatic for yeast infection after completion of antibiotics; also advised probiotic     Lab review: no lab studies available for review at time of visit      Today's Visit: POC Rapid Strep, Augmentin, Fluconazole    Health Maintenance:     I have discussed the diagnosis with the patient and the intended plan as seen in the above orders. The patient has received an after-visit summary and questions were answered concerning future plans. I have discussed medication side effects and warnings with the patient as well.  I have reviewed the plan of care with the patient, accepted their input and they are in agreement with the treatment goals. Follow-up Disposition:  Return if symptoms worsen or fail to improve. More than 1/2 of this 15 minute visit was spent in counseling and coordination of care, as described above.     RAMIN Lo

## 2018-04-23 NOTE — PATIENT INSTRUCTIONS
Sore Throat: Care Instructions  Your Care Instructions    Infection by bacteria or a virus causes most sore throats. Cigarette smoke, dry air, air pollution, allergies, and yelling can also cause a sore throat. Sore throats can be painful and annoying. Fortunately, most sore throats go away on their own. If you have a bacterial infection, your doctor may prescribe antibiotics. Follow-up care is a key part of your treatment and safety. Be sure to make and go to all appointments, and call your doctor if you are having problems. It's also a good idea to know your test results and keep a list of the medicines you take. How can you care for yourself at home? · If your doctor prescribed antibiotics, take them as directed. Do not stop taking them just because you feel better. You need to take the full course of antibiotics. · Gargle with warm salt water once an hour to help reduce swelling and relieve discomfort. Use 1 teaspoon of salt mixed in 1 cup of warm water. · Take an over-the-counter pain medicine, such as acetaminophen (Tylenol), ibuprofen (Advil, Motrin), or naproxen (Aleve). Read and follow all instructions on the label. · Be careful when taking over-the-counter cold or flu medicines and Tylenol at the same time. Many of these medicines have acetaminophen, which is Tylenol. Read the labels to make sure that you are not taking more than the recommended dose. Too much acetaminophen (Tylenol) can be harmful. · Drink plenty of fluids. Fluids may help soothe an irritated throat. Hot fluids, such as tea or soup, may help decrease throat pain. · Use over-the-counter throat lozenges to soothe pain. Regular cough drops or hard candy may also help. These should not be given to young children because of the risk of choking. · Do not smoke or allow others to smoke around you. If you need help quitting, talk to your doctor about stop-smoking programs and medicines.  These can increase your chances of quitting for good. · Use a vaporizer or humidifier to add moisture to your bedroom. Follow the directions for cleaning the machine. When should you call for help? Call your doctor now or seek immediate medical care if:  ? · You have new or worse trouble swallowing. ? · Your sore throat gets much worse on one side. ? Watch closely for changes in your health, and be sure to contact your doctor if you do not get better as expected. Where can you learn more? Go to http://danial-patricia.info/. Enter [phone] [id number] in the search box to learn more about \"Sore Throat: Care Instructions. \"  Current as of: May 12, 2017  Content Version: 11.4  © 6244-0030 [employer]wise[employer]. Care instructions adapted under license by [employer] (which disclaims liability or warranty for this information). If you have questions about a medical condition or this instruction, always ask your healthcare professional. [address] any warranty or liability for your use of this information.

## 2018-10-04 ENCOUNTER — DOCUMENTATION ONLY (OUTPATIENT)
Dept: FAMILY MEDICINE CLINIC | Age: 33
End: 2018-10-04

## 2018-11-01 DIAGNOSIS — Z30.011 ENCOUNTER FOR INITIAL PRESCRIPTION OF CONTRACEPTIVE PILLS: ICD-10-CM

## 2018-11-05 RX ORDER — NORETHINDRONE ACETATE AND ETHINYL ESTRADIOL 1MG-20(21)
1 KIT ORAL DAILY
Qty: 3 PACKAGE | Refills: 4 | Status: SHIPPED | OUTPATIENT
Start: 2018-11-05

## 2018-11-19 NOTE — PATIENT INSTRUCTIONS

## 2023-09-08 NOTE — PROGRESS NOTES
Colposcopy preformed today Goal Outcome Evaluation:      Plan of Care Reviewed With: patient    Overall Patient Progress: improvingOverall Patient Progress: improving    Outcome Evaluation: Occassionally has some SOB, improves with scheduled inhaler. Had some mild back pain today after making her bed, improved with tylenol. BP elevated this afternoon, scheduled q8 hydralazine given. Trending creatinine. Possible discharge home tomorrow.

## 2024-08-22 NOTE — LETTER
10/4/2018 Steve Munoz 126 [facility] [address] [place] [phone] Dear Ms. Steve Munoz, We had an appointment reserved for you on 10/3/18 and were concerned when you did not show or call within 24 hours to cancel the appointment. Our policy is to call patients two days prior to their appointment to remind them of the date and time. We perform these calls as a courtesy to our patients and to allow us the opportunity to rebook the time slot should the appointment not be necessary. Recognizing that everyones time is valuable and that appointment time is limited, we ask that you provide 24 hours notice if you are unable to keep your appointment. Please call us at your earliest convenience to reschedule your appointment as your provider felt it was important to see you. Thank you for your anticipated cooperation. [address] [facility] [address] 
[phone] [FreeTextEntry1] : This note was written by Solange Boyd, acting as the  for Dr. Gorman. This note accurately reflects the work and decisions made by Dr. Gorman.